# Patient Record
Sex: FEMALE | Race: WHITE | Employment: STUDENT | URBAN - METROPOLITAN AREA
[De-identification: names, ages, dates, MRNs, and addresses within clinical notes are randomized per-mention and may not be internally consistent; named-entity substitution may affect disease eponyms.]

---
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
## 2019-02-08 ENCOUNTER — OFFICE VISIT (OUTPATIENT)
Dept: GERIATRIC MEDICINE | Age: 19
End: 2019-02-08

## 2019-02-08 VITALS
HEIGHT: 64 IN | OXYGEN SATURATION: 100 % | WEIGHT: 151 LBS | SYSTOLIC BLOOD PRESSURE: 124 MMHG | BODY MASS INDEX: 25.78 KG/M2 | DIASTOLIC BLOOD PRESSURE: 74 MMHG | RESPIRATION RATE: 16 BRPM | HEART RATE: 78 BPM

## 2019-02-08 DIAGNOSIS — F33.2 SEVERE EPISODE OF RECURRENT MAJOR DEPRESSIVE DISORDER, WITHOUT PSYCHOTIC FEATURES (HCC): Primary | ICD-10-CM

## 2019-02-08 DIAGNOSIS — Z98.890 STATUS POST LUMBAR DISCECTOMY: ICD-10-CM

## 2019-02-08 DIAGNOSIS — M54.41 CHRONIC MIDLINE LOW BACK PAIN WITH RIGHT-SIDED SCIATICA: ICD-10-CM

## 2019-02-08 DIAGNOSIS — R45.89 FEELING HOPELESS: ICD-10-CM

## 2019-02-08 DIAGNOSIS — G89.29 CHRONIC MIDLINE LOW BACK PAIN WITH RIGHT-SIDED SCIATICA: ICD-10-CM

## 2019-02-08 DIAGNOSIS — L05.91 PILONIDAL CYST: ICD-10-CM

## 2019-02-08 DIAGNOSIS — M53.3 SACRAL PAIN: ICD-10-CM

## 2019-02-08 RX ORDER — DULOXETIN HYDROCHLORIDE 30 MG/1
30 CAPSULE, DELAYED RELEASE ORAL
Qty: 45 CAP | Refills: 0 | Status: SHIPPED | OUTPATIENT
Start: 2019-02-08 | End: 2019-02-22

## 2019-02-08 RX ORDER — TRAMADOL HYDROCHLORIDE AND ACETAMINOPHEN 37.5; 325 MG/1; MG/1
1 TABLET ORAL
Qty: 120 TAB | Refills: 0 | Status: SHIPPED | OUTPATIENT
Start: 2019-02-08

## 2019-02-08 RX ORDER — PREDNISONE 20 MG/1
TABLET ORAL
Qty: 30 TAB | Refills: 0 | Status: SHIPPED | OUTPATIENT
Start: 2019-02-08 | End: 2019-02-22 | Stop reason: ALTCHOICE

## 2019-02-08 NOTE — PROGRESS NOTES
ADVISED PATIENT OF THE FOLLOWING HEALTH MAINTAINCE DUE  Health Maintenance Due   Topic Date Due    Hepatitis A Peds Age 1-18 (2 of 2 - 2-dose series) 08/07/2002      Chief Complaint   Patient presents with    Medication Evaluation       1. Have you been to the ER, urgent care clinic since your last visit? Hospitalized since your last visit? No    2. Have you seen or consulted any other health care providers outside of the [address] [place] [address] since your last visit? Include any DEXA scan, mammography  or colon screening. No    3. Do you have an Advance Directive on file? no    4. Do you have a DNR on file? Full    Patient is accompanied by self I have received verbal consent from Antonella Leon to discuss any/all medical information while they are present in the room.       Advance Care Planning 2/8/2019   Patient's Healthcare Decision Maker is: Verbal statement (Legal Next of Kin remains as decision maker)   Confirm Advance Directive None   Patient Would Like to Complete Advance Directive [facility] [id number] - [place] AT [address] [address]  Phone: [phone] Fax: [phone]    Antonella Leon presents for lab draw ordered by Sena Hendrickson RN MSN ACNPC-AG-NP    The following labs were drawn and sent to lab by Fiordaliza Mckeon LPN:    CBC, Lipid Profile, CMP and HgA1C    The following tubes were sent:    Red ( 2), Lavender  ( 2) and Milo    Patient tolerated procedure well, blood obtained via venipuncture to left antecubital

## 2019-02-08 NOTE — LETTER
[facility] 
[phone] Tierra Joiner [address] [facility] [address] AFTER VISIT INSTRUCTIONS/PLAN OF CARE 02/08/19Discontinued Medication/Treatment:     
THE FOLLOWING MEDICATION/TREATMENTS HAVE BEEN STOPPED AFTER YOUR VISIT TODAY! Medications Discontinued During This Encounter Medication Reason  doxycycline (ADOXA) 50 mg tablet Therapy Completed  triamcinolone acetonide (KENALOG) 0.025 % ointment Therapy Completed  traMADol (ULTRAM) 50 mg tablet Therapy Completed Treatment Plan: Your prescriptions have been electronically sent to the pharmacy you selected:  
[facility], [address] [address]    Phone: [phone] Fax: [phone] If you do not receive your medications it is your responsibility to contact the pharmacy directly. Instructions for Prednisone: Today starting:  
Take 40 mg (2 tabs) by mouth daily x 3 days, then Take 30 mg (1 1/2 tabs) by mouth daily x 3 days, then Take 20 mg (1 tab) by mouth daily x 3 days, then Take 10 mg (1/2 tab) by mouth daily until finished. Orders Placed This Encounter  traMADol-acetaminophen (ULTRACET) 37.5-325 mg per tablet Sig: Take 1 Tab by mouth every six (6) hours as needed for Pain. Max Daily Amount: 6 Tabs. Dispense:  120 Tab This is in place of Tramadol. Take this as needed or scheduled every 6 hours for pain. I recommend you take one prior to your therapy appointments to help with the post pain.  predniSONE (DELTASONE) 20 mg tablet  (SEE INSTRUCTIONS ABOVE) Sig: Take as directed. Dispense:  30 Tab Take this for inflammation inside of joints, muscular pain, tail bone pain, back pain. Please try to get a journal of the pain & how you feel taking the medication.  DULoxetine (CYMBALTA) 30 mg capsule (If this is too expensive, go to the web site for Cymbalta, sign up for the prescription savings card)    This is for your mood, neuropathy pain, muscular pain. Do not skip this and take it before bed nightly. Sig: Take 1 Cap by mouth nightly. Dispense:  45 Cap Refill:  0 Please call me before you do not take something or you change from the plan. So I can be on the same page with you! Return 3 weeks for us to update.

## 2019-02-11 LAB
1,25(OH)2D3 SERPL-MCNC: 62.1 PG/ML (ref 19.9–79.3)
ALBUMIN SERPL-MCNC: 4.4 G/DL (ref 3.5–5.5)
ALBUMIN/GLOB SERPL: 2 {RATIO} (ref 1.2–2.2)
ALP SERPL-CCNC: 61 IU/L (ref 39–117)
ALT SERPL-CCNC: 6 IU/L (ref 0–32)
AMPHETAMINES UR QL SCN: NEGATIVE NG/ML
APPEARANCE UR: CLEAR
AST SERPL-CCNC: 16 IU/L (ref 0–40)
BARBITURATES UR QL SCN: NEGATIVE NG/ML
BASOPHILS # BLD AUTO: 0 X10E3/UL (ref 0–0.2)
BASOPHILS NFR BLD AUTO: 0 %
BENZODIAZ UR QL: NEGATIVE NG/ML
BILIRUB SERPL-MCNC: 0.7 MG/DL (ref 0–1.2)
BILIRUB UR QL STRIP: NEGATIVE
BUN SERPL-MCNC: 6 MG/DL (ref 6–20)
BUN/CREAT SERPL: 8 (ref 9–23)
BZE UR QL: NEGATIVE NG/ML
CALCIUM SERPL-MCNC: 9.1 MG/DL (ref 8.7–10.2)
CANNABINOIDS UR QL SCN: NEGATIVE NG/ML
CHLORIDE SERPL-SCNC: 105 MMOL/L (ref 96–106)
CHOLEST SERPL-MCNC: 131 MG/DL (ref 100–199)
CO2 SERPL-SCNC: 20 MMOL/L (ref 20–29)
COLOR UR: YELLOW
CREAT SERPL-MCNC: 0.8 MG/DL (ref 0.57–1)
EOSINOPHIL # BLD AUTO: 0.1 X10E3/UL (ref 0–0.4)
EOSINOPHIL NFR BLD AUTO: 3 %
ERYTHROCYTE [DISTWIDTH] IN BLOOD BY AUTOMATED COUNT: 12.9 % (ref 12.3–15.4)
EST. AVERAGE GLUCOSE BLD GHB EST-MCNC: 97 MG/DL
FOLATE SERPL-MCNC: >20 NG/ML
GLOBULIN SER CALC-MCNC: 2.2 G/DL (ref 1.5–4.5)
GLUCOSE SERPL-MCNC: 83 MG/DL (ref 65–99)
GLUCOSE UR QL: NEGATIVE
HBA1C MFR BLD: 5 % (ref 4.8–5.6)
HCG UR QL: NEGATIVE
HCT VFR BLD AUTO: 37.4 % (ref 34–46.6)
HDLC SERPL-MCNC: 57 MG/DL
HGB BLD-MCNC: 12.7 G/DL (ref 11.1–15.9)
HGB UR QL STRIP: NEGATIVE
IMM GRANULOCYTES # BLD AUTO: 0 X10E3/UL (ref 0–0.1)
IMM GRANULOCYTES NFR BLD AUTO: 0 %
KETONES UR QL STRIP: NEGATIVE
LDLC SERPL CALC-MCNC: 53 MG/DL (ref 0–99)
LEUKOCYTE ESTERASE UR QL STRIP: NEGATIVE
LYMPHOCYTES # BLD AUTO: 1.5 X10E3/UL (ref 0.7–3.1)
LYMPHOCYTES NFR BLD AUTO: 33 %
MCH RBC QN AUTO: 31.4 PG (ref 26.6–33)
MCHC RBC AUTO-ENTMCNC: 34 G/DL (ref 31.5–35.7)
MCV RBC AUTO: 92 FL (ref 79–97)
MDMA UR QL SCN: NEGATIVE NG/ML
METHADONE UR QL SCN: NEGATIVE NG/ML
METHAQUALONE UR QL: NEGATIVE NG/ML
MICRO URNS: NORMAL
MONOCYTES # BLD AUTO: 0.3 X10E3/UL (ref 0.1–0.9)
MONOCYTES NFR BLD AUTO: 7 %
NEUTROPHILS # BLD AUTO: 2.6 X10E3/UL (ref 1.4–7)
NEUTROPHILS NFR BLD AUTO: 57 %
NITRITE UR QL STRIP: NEGATIVE
OPIATES UR QL: NEGATIVE NG/ML
PCP UR QL: NEGATIVE NG/ML
PH UR STRIP: 6 [PH] (ref 5–7.5)
PLATELET # BLD AUTO: 267 X10E3/UL (ref 150–379)
POTASSIUM SERPL-SCNC: 3.6 MMOL/L (ref 3.5–5.2)
PROPOXYPH UR QL SCN: NEGATIVE NG/ML
PROT SERPL-MCNC: 6.6 G/DL (ref 6–8.5)
PROT UR QL STRIP: NEGATIVE
RBC # BLD AUTO: 4.05 X10E6/UL (ref 3.77–5.28)
SODIUM SERPL-SCNC: 142 MMOL/L (ref 134–144)
SP GR UR: 1.01 (ref 1–1.03)
SPECIMEN STATUS REPORT, ROLRST: NORMAL
TRIGL SERPL-MCNC: 105 MG/DL (ref 0–149)
TSH SERPL DL<=0.005 MIU/L-ACNC: 4.18 UIU/ML (ref 0.45–4.5)
UROBILINOGEN UR STRIP-MCNC: 0.2 MG/DL (ref 0.2–1)
VIT B12 SERPL-MCNC: 527 PG/ML (ref 232–1245)
VLDLC SERPL CALC-MCNC: 21 MG/DL (ref 5–40)
WBC # BLD AUTO: 4.5 X10E3/UL (ref 3.4–10.8)

## 2019-02-15 NOTE — PROGRESS NOTES
Advise patient in writing -     Labs look amazing. No signs of infections, blood loss anemias, kidney, liver, or other impairments. Remind patient I want to see her in the office soon for follow up on treatments started.

## 2019-02-22 ENCOUNTER — OFFICE VISIT (OUTPATIENT)
Dept: GERIATRIC MEDICINE | Age: 19
End: 2019-02-22

## 2019-02-22 VITALS
OXYGEN SATURATION: 98 % | DIASTOLIC BLOOD PRESSURE: 78 MMHG | SYSTOLIC BLOOD PRESSURE: 111 MMHG | BODY MASS INDEX: 25.35 KG/M2 | RESPIRATION RATE: 16 BRPM | HEART RATE: 79 BPM | WEIGHT: 148.5 LBS | HEIGHT: 64 IN | TEMPERATURE: 99.1 F

## 2019-02-22 DIAGNOSIS — J11.1 FLU: ICD-10-CM

## 2019-02-22 DIAGNOSIS — Z78.9 FULL CODE STATUS: ICD-10-CM

## 2019-02-22 DIAGNOSIS — J02.0 STREP SORE THROAT: ICD-10-CM

## 2019-02-22 DIAGNOSIS — R50.9 FEVER IN ADULT: ICD-10-CM

## 2019-02-22 DIAGNOSIS — F33.2 SEVERE EPISODE OF RECURRENT MAJOR DEPRESSIVE DISORDER, WITHOUT PSYCHOTIC FEATURES (HCC): ICD-10-CM

## 2019-02-22 DIAGNOSIS — Z71.2 ENCOUNTER TO DISCUSS TEST RESULTS: ICD-10-CM

## 2019-02-22 DIAGNOSIS — F41.9 ANXIETY: ICD-10-CM

## 2019-02-22 DIAGNOSIS — R09.89 CHEST CONGESTION: Primary | ICD-10-CM

## 2019-02-22 LAB
FLUAV+FLUBV AG NOSE QL IA.RAPID: NEGATIVE POS/NEG
FLUAV+FLUBV AG NOSE QL IA.RAPID: POSITIVE POS/NEG
VALID INTERNAL CONTROL?: YES

## 2019-02-22 RX ORDER — ESCITALOPRAM OXALATE 5 MG/1
TABLET ORAL
Qty: 30 TAB | Refills: 0 | Status: SHIPPED | OUTPATIENT
Start: 2019-02-22 | End: 2019-03-22

## 2019-02-22 RX ORDER — OSELTAMIVIR PHOSPHATE 75 MG/1
75 CAPSULE ORAL 2 TIMES DAILY
Qty: 10 CAP | Refills: 0 | Status: SHIPPED | OUTPATIENT
Start: 2019-02-22 | End: 2019-02-27

## 2019-02-22 RX ORDER — AMOXICILLIN 400 MG/5ML
50 POWDER, FOR SUSPENSION ORAL 2 TIMES DAILY
Qty: 300 ML | Refills: 0 | Status: SHIPPED | OUTPATIENT
Start: 2019-02-22 | End: 2019-03-02

## 2019-02-22 NOTE — PROGRESS NOTES
Reason for Visit/HPI:   
Juliana Stone is a 20 y.o. female patient who presents today for Chief Complaint Patient presents with  Medication Evaluation Diagnosis/Treatment Plan:   
Diagnoses and all orders for this visit: 1. Severe episode of recurrent major depressive disorder, without psychotic features ([facility] [address] Comments: PHQ9 today was 8. She is obviously struggling with depression, anxiety, & hopelessness due to her medical conditions. Discussed with patient the pros and cons to using a medication to help control emotions, feelings, and health. She agrees and requests to try Cymbalta after researching this medication. We will start slow and low. Move up as able. She was instructed to call me when she needs someone to talk to or to discuss medications changes. Orders: 
-     CBC WITH AUTOMATED DIFF 
-     COLLECTION VENOUS BLOOD,VENIPUNCTURE 
-     METABOLIC PANEL, COMPREHENSIVE 
-     HEMOGLOBIN A1C WITH EAG 
-     LIPID PANEL 
-     VITAMIN D, 1, 25 DIHYDROXY 
-     VITAMIN B12 & FOLATE 
-     TSH REFLEX TO T4 
-     traMADol-acetaminophen (ULTRACET) 37.5-325 mg per tablet; Take 1 Tab by mouth every six (6) hours as needed for Pain. Max Daily Amount: 6 Tabs. -     10-PANEL URINE DRUG SCREEN 
-     URINALYSIS W/ RFLX MICROSCOPIC 
-     HCG URINE, QL 
 
2. Feeling hopeless Comments: [facility] is crying in the appointment. She is overwhelmed with her medical conditions as well as confused on how her future looks. Orders: 
-     CBC WITH AUTOMATED DIFF 
-     COLLECTION VENOUS BLOOD,VENIPUNCTURE 
-     METABOLIC PANEL, COMPREHENSIVE 
-     HEMOGLOBIN A1C WITH EAG 
-     LIPID PANEL 
-     VITAMIN D, 1, 25 DIHYDROXY 
-     VITAMIN B12 & FOLATE 
-     TSH REFLEX TO T4 
-     traMADol-acetaminophen (ULTRACET) 37.5-325 mg per tablet; Take 1 Tab by mouth every six (6) hours as needed for Pain. Max Daily Amount: 6 Tabs.  
-     10-PANEL URINE DRUG SCREEN 
-     URINALYSIS W/ RFLX MICROSCOPIC 
 -     HCG URINE, QL 
 
3. Chronic midline low back pain with right-sided sciatica Comments: 
Pain continues 3 months after surgery to be present daily. She is seeing her specialist next week for assessment of her conditions. Orders: 
-     CBC WITH AUTOMATED DIFF 
-     COLLECTION VENOUS BLOOD,VENIPUNCTURE 
-     METABOLIC PANEL, COMPREHENSIVE 
-     HEMOGLOBIN A1C WITH EAG 
-     LIPID PANEL 
-     VITAMIN D, 1, 25 DIHYDROXY 
-     VITAMIN B12 & FOLATE 
-     TSH REFLEX TO T4 
-     traMADol-acetaminophen (ULTRACET) 37.5-325 mg per tablet; Take 1 Tab by mouth every six (6) hours as needed for Pain. Max Daily Amount: 6 Tabs. -     10-PANEL URINE DRUG SCREEN 
-     URINALYSIS W/ RFLX MICROSCOPIC 
-     HCG URINE, QL 
 
4. Status post lumbar discectomy Comments: 
3 months post operative eval in [place] next Monday. She is looking forward to this as she believe she is having too much pain to be 3 months post op. Orders: 
-     CBC WITH AUTOMATED DIFF 
-     COLLECTION VENOUS BLOOD,VENIPUNCTURE 
-     METABOLIC PANEL, COMPREHENSIVE 
-     HEMOGLOBIN A1C WITH EAG 
-     LIPID PANEL 
-     VITAMIN D, 1, 25 DIHYDROXY 
-     VITAMIN B12 & FOLATE 
-     TSH REFLEX TO T4 
-     traMADol-acetaminophen (ULTRACET) 37.5-325 mg per tablet; Take 1 Tab by mouth every six (6) hours as needed for Pain. Max Daily Amount: 6 Tabs. -     10-PANEL URINE DRUG SCREEN 
-     URINALYSIS W/ RFLX MICROSCOPIC 
-     HCG URINE, QL 
 
5. Sacral pain Comments: 
Pt states she is having increased lower back & sacral pain all the time. Her PT seems to be irritating this more. She wants to stop her PT. Orders: 
-     CBC WITH AUTOMATED DIFF 
-     COLLECTION VENOUS BLOOD,VENIPUNCTURE 
-     METABOLIC PANEL, COMPREHENSIVE 
-     HEMOGLOBIN A1C WITH EAG 
-     LIPID PANEL 
-     VITAMIN D, 1, 25 DIHYDROXY 
-     VITAMIN B12 & FOLATE 
-     TSH REFLEX TO T4 
-     traMADol-acetaminophen (ULTRACET) 37.5-325 mg per tablet;  Take 1 Tab by mouth every six (6) hours as needed for Pain. Max Daily Amount: 6 Tabs. -     10-PANEL URINE DRUG SCREEN 
-     URINALYSIS W/ RFLX MICROSCOPIC 
-     HCG URINE, QL 
 
6. Pilonidal cyst 
Comments: 
Patient is concerned she has a pilonidal cyst that could be aggrevated by her PT> Orders: 
-     traMADol-acetaminophen (ULTRACET) 37.5-325 mg per tablet; Take 1 Tab by mouth every six (6) hours as needed for Pain. Max Daily Amount: 6 Tabs. Other orders -     SPECIMEN STATUS REPORT Discontinued Care: The following treatment modalities have been discontinued by the provider today:  
Medications Discontinued During This Encounter Medication Reason  doxycycline (ADOXA) 50 mg tablet Therapy Completed  triamcinolone acetonide (KENALOG) 0.025 % ointment Therapy Completed  traMADol (ULTRAM) 50 mg tablet Therapy Completed Follow Up: Follow-up Disposition: 
Return in about 1 month (around 3/8/2019) for with the NP for follow up to your treatment plan. Subjective: No Known Allergies Prior to Admission medications Medication Sig Start Date End Date Taking? Authorizing Provider  
traMADol-acetaminophen (ULTRACET) 37.5-325 mg per tablet Take 1 Tab by mouth every six (6) hours as needed for Pain. Max Daily Amount: 6 Tabs. 2/8/19  Yes Sheryl Auguste NP  
ibuprofen (MOTRIN) 400 mg tablet Take  by mouth every six (6) hours as needed for Pain. Yes Provider, Susu  
dimethicone ([facility]) 2 % topical cream Apply to scars once daily until healed. 12/3/18  Yes Sheryl Auguste NP  
oseltamivir (TAMIFLU) 75 mg capsule Take 1 Cap by mouth two (2) times a day for 5 days. 2/22/19 2/27/19  Sheryl Auguste NP  
amoxicillin (AMOXIL) 400 mg/5 mL suspension Take 21.1 mL by mouth two (2) times a day for 8 days. 2/22/19 3/2/19  Sheryl Auguste NP  
magic mouthwash solution Take 5 mL by mouth three (3) times daily as needed for Pain for up to 5 days. Magic mouth wash Maalox Lidocaine 2% viscous Diphenhydramine oral solution Pharmacy to mix equal portions of ingredients to a total volume as indicated in the dispense amount. 2/22/19 2/27/19  Chloe Colón NP Codeine-guaiFENesin 6.3-100 mg/5 mL liqd Take 2.5-5 mL by mouth every twelve (12) hours as needed for up to 5 days. Max Daily Amount: 10 mL. 2/22/19 2/27/19  Chloe Colón NP  
escitalopram oxalate (LEXAPRO) 5 mg tablet Take 1 Tab by mouth daily for 14 days, THEN 2 Tabs daily for 14 days. 2/22/19 3/22/19  Chloe Colón NP Past Medical History:  
Diagnosis Date  Anxiety 7/27/2017  Asthma  Bertolotti's syndrome  Depression  GERD (gastroesophageal reflux disease)  Migraines   
 migraines Past Surgical History:  
Procedure Laterality Date  DERMATOLOGICAL PROCEDURE  2002  
 purple lesion removed from lower back  HX BACK SURGERY  11/06/2018  HX LUMBAR DISKECTOMY L4 L5 Social History Tobacco Use  Smoking status: Never Smoker  Smokeless tobacco: Never Used Substance Use Topics  Alcohol use: No  
 Drug use: No  
  
Family History Problem Relation Age of Onset  Arthritis-rheumatoid Father  Other Father   
     muscular neuropathy  Hypertension Maternal Grandfather  Cancer Paternal Grandfather   
     bone marrow Advance Care Planning 2/8/2019 Patient's Healthcare Decision Maker is: Verbal statement (Legal Next of Kin remains as decision maker) Confirm Advance Directive None Patient Would Like to Complete Advance Directive No  
 
Test Results:  
 
Office Visit on 02/08/2019 Component Date Value Ref Range Status  WBC 02/08/2019 4.5  3.4 - 10.8 x10E3/uL Final  
 RBC 02/08/2019 4.05  3.77 - 5.28 x10E6/uL Final  
 HGB 02/08/2019 12.7  11.1 - 15.9 g/dL Final  
 HCT 02/08/2019 37.4  34.0 - 46.6 % Final  
 MCV 02/08/2019 92  79 - 97 fL Final  
 MCH 02/08/2019 31.4  26.6 - 33.0 pg Final  
  MCHC 02/08/2019 34.0  31.5 - 35.7 g/dL Final  
 RDW 02/08/2019 12.9  12.3 - 15.4 % Final  
 PLATELET 63/44/6893 756  150 - 379 x10E3/uL Final  
 NEUTROPHILS 02/08/2019 57  Not Estab. % Final  
 Lymphocytes 02/08/2019 33  Not Estab. % Final  
 MONOCYTES 02/08/2019 7  Not Estab. % Final  
 EOSINOPHILS 02/08/2019 3  Not Estab. % Final  
 BASOPHILS 02/08/2019 0  Not Estab. % Final  
 ABS. NEUTROPHILS 02/08/2019 2.6  1.4 - 7.0 x10E3/uL Final  
 Abs Lymphocytes 02/08/2019 1.5  0.7 - 3.1 x10E3/uL Final  
 ABS. MONOCYTES 02/08/2019 0.3  0.1 - 0.9 x10E3/uL Final  
 ABS. EOSINOPHILS 02/08/2019 0.1  0.0 - 0.4 x10E3/uL Final  
 ABS. BASOPHILS 02/08/2019 0.0  0.0 - 0.2 x10E3/uL Final  
 IMMATURE GRANULOCYTES 02/08/2019 0  Not Estab. % Final  
 ABS. IMM. GRANS. 02/08/2019 0.0  0.0 - 0.1 x10E3/uL Final  
 Glucose 02/08/2019 83  65 - 99 mg/dL Final  
 BUN 02/08/2019 6  6 - 20 mg/dL Final  
 Creatinine 02/08/2019 0.80  0.57 - 1.00 mg/dL Final  
 GFR est non-AA 02/08/2019 106  >59 mL/min/1.73 Final  
 GFR est AA 02/08/2019 123  >59 mL/min/1.73 Final  
 BUN/Creatinine ratio 02/08/2019 8* 9 - 23 Final  
 Sodium 02/08/2019 142  134 - 144 mmol/L Final  
 Potassium 02/08/2019 3.6  3.5 - 5.2 mmol/L Final  
 Chloride 02/08/2019 105  96 - 106 mmol/L Final  
 CO2 02/08/2019 20  20 - 29 mmol/L Final  
 Calcium 02/08/2019 9.1  8.7 - 10.2 mg/dL Final  
 Protein, total 02/08/2019 6.6  6.0 - 8.5 g/dL Final  
 Albumin 02/08/2019 4.4  3.5 - 5.5 g/dL Final  
 GLOBULIN, TOTAL 02/08/2019 2.2  1.5 - 4.5 g/dL Final  
 A-G Ratio 02/08/2019 2.0  1.2 - 2.2 Final  
 Bilirubin, total 02/08/2019 0.7  0.0 - 1.2 mg/dL Final  
 Alk. phosphatase 02/08/2019 61  39 - 117 IU/L Final  
 AST (SGOT) 02/08/2019 16  0 - 40 IU/L Final  
 ALT (SGPT) 02/08/2019 6  0 - 32 IU/L Final  
 Hemoglobin A1c 02/08/2019 5.0  4.8 - 5.6 % Final  
 Comment:          Prediabetes: 5.7 - 6.4 Diabetes: >6.4 Glycemic control for adults with diabetes: <7.0  Estimated average glucose 02/08/2019 97  mg/dL Final  
 Cholesterol, total 02/08/2019 131  100 - 199 mg/dL Final  
 Triglyceride 02/08/2019 105  0 - 149 mg/dL Final  
 HDL Cholesterol 02/08/2019 57  >39 mg/dL Final  
 VLDL, calculated 02/08/2019 21  5 - 40 mg/dL Final  
 LDL, calculated 02/08/2019 53  0 - 99 mg/dL Final  
 Calcitriol (Vit D 1, 25 di-OH) 02/08/2019 62.1  19.9 - 79.3 pg/mL Final  
 Vitamin B12 02/08/2019 527  232 - 1,245 pg/mL Final  
 Folate 02/08/2019 >20.0  >3.0 ng/mL Final  
 Comment: A serum folate concentration of less than 3.1 ng/mL is 
considered to represent clinical deficiency.  TSH 02/08/2019 4.180  0.450 - 4.500 uIU/mL Final  
 Amphetamines, urine 02/08/2019 Negative  Wgmish=4690 ng/mL Final  
 Amphetamine test includes Amphetamine and Methamphetamine.  MDMA 02/08/2019 Negative  Cyjfin=536 ng/mL Final  
 Barbiturates 02/08/2019 Negative  Lyobro=999 ng/mL Final  
 Benzodiazepines 02/08/2019 Negative  Tupuax=971 ng/mL Final  
 Cannabinoids 02/08/2019 Negative  Cutoff=50 ng/mL Final  
 Cocaine 02/08/2019 Negative  Lddkzs=872 ng/mL Final  
 Methaqualone 02/08/2019 Negative  Pojoko=081 ng/mL Final  
 Opiates 02/08/2019 Negative  Asmjel=009 ng/mL Final  
 Opiate test includes Codeine and Morphine only.   
 Phencyclidine 02/08/2019 Negative  Cutoff=25 ng/mL Final  
 Methadone Screen, Urine 02/08/2019 Negative  Fnghnh=960 ng/mL Final  
 PROPOXYPHENE, URINE 02/08/2019 Negative  Tolcrt=545 ng/mL Final  
 Specific Gravity 02/08/2019 1.007  1.005 - 1.030 Final  
 pH (UA) 02/08/2019 6.0  5.0 - 7.5 Final  
 Color 02/08/2019 Yellow  Yellow Final  
 Appearance 02/08/2019 Clear  Clear Final  
 Leukocyte Esterase 02/08/2019 Negative  Negative Final  
 Protein 02/08/2019 Negative  Negative/Trace Final  
 Glucose 02/08/2019 Negative  Negative Final  
 Ketone 02/08/2019 Negative  Negative Final  
  Blood 02/08/2019 Negative  Negative Final  
 Bilirubin 02/08/2019 Negative  Negative Final  
 Urobilinogen 02/08/2019 0.2  0.2 - 1.0 mg/dL Final  
 Nitrites 02/08/2019 Negative  Negative Final  
 Microscopic Examination 02/08/2019 Comment   Final  
 Microscopic not indicated and not performed.  Pregnancy test, urine 02/08/2019 Negative  Negative Final  
Admission on 11/16/2018, Discharged on 11/16/2018 Component Date Value Ref Range Status  Calcium, ionized (POC) 11/16/2018 1.24  1.12 - 1.32 mmol/L Final  
 Sodium (POC) 11/16/2018 139  136 - 145 mmol/L Final  
 Potassium (POC) 11/16/2018 3.8  3.5 - 5.1 mmol/L Final  
 Chloride (POC) 11/16/2018 101  98 - 107 mmol/L Final  
 CO2 (POC) 11/16/2018 26  21 - 32 mmol/L Final  
 Anion gap (POC) 11/16/2018 17  10 - 20 mmol/L Final  
 PLEASE NOTE NEW REFERENCE RANGE  
 Glucose (POC) 11/16/2018 91  65 - 100 mg/dL Final  
 BUN (POC) 11/16/2018 4* 9 - 20 mg/dL Final  
 Creatinine (POC) 11/16/2018 0.6  0.6 - 1.3 mg/dL Final  
 GFRAA, POC 11/16/2018 >60  >60 ml/min/1.73m2 Final  
 GFRNA, POC 11/16/2018 >60  >60 ml/min/1.73m2 Final  
 Hematocrit (POC) 11/16/2018 35  35.0 - 47.0 % Final  
 Comment 11/16/2018 Comment Not Indicated. Final  
 
 
Objective:  
 
Vitals:  
 02/08/19 1312 BP: 124/74 Pulse: 78 Resp: 16 SpO2: 100% Weight: 151 lb (68.5 kg) Height: 5' 4\" (1.626 m) Wt Readings from Last 3 Encounters:  
02/22/19 148 lb 8 oz (67.4 kg) 02/08/19 151 lb (68.5 kg) 12/03/18 148 lb (67.1 kg) (78 %, Z= 0.78)* * Growth percentiles are based on CDC (Girls, 2-20 Years) data. BP Readings from Last 3 Encounters:  
02/22/19 111/78  
02/08/19 124/74  
12/03/18 111/73 (41 %, Z = -0.24 /  80 %, Z = 0.86)* *BP percentiles are based on the August 2017 AAP Clinical Practice Guideline for girls Review of Systems Constitutional: Positive for fatigue. Negative for activity change, appetite change and fever. HENT: Negative for congestion, dental problem, ear pain, hearing loss, postnasal drip, sinus pressure, sneezing, sore throat and trouble swallowing. Eyes: Negative for discharge, redness and visual disturbance. Respiratory: Negative for apnea, cough, chest tightness, shortness of breath and wheezing. Cardiovascular: Negative for chest pain, palpitations and leg swelling. Gastrointestinal: Negative for abdominal distention, abdominal pain, blood in stool, constipation, diarrhea, nausea and vomiting. Endocrine: Negative for polydipsia, polyphagia and polyuria. Genitourinary: Negative for flank pain, frequency and urgency. Musculoskeletal: Positive for arthralgias, back pain and gait problem. Negative for joint swelling and neck pain. Skin: Negative for color change, pallor, rash and wound. Allergic/Immunologic: Negative for environmental allergies and food allergies. Neurological: Positive for dizziness and weakness. Negative for tremors, light-headedness, numbness and headaches. Hematological: Negative for adenopathy. Psychiatric/Behavioral: Positive for decreased concentration, dysphoric mood and sleep disturbance. Negative for agitation and confusion. The patient is nervous/anxious. Physical Exam  
Constitutional: She is oriented to person, place, and time. Vital signs are normal. She appears to not be writhing in pain, not malnourished and not dehydrated. She appears unhealthy. She does not have a sickly appearance. She appears distressed. Young, frail,  female. Mood is sad with crying spells. She has multiple medical conditions that appear to be wearing on her. HENT:  
Head: Normocephalic and atraumatic. Right Ear: Hearing, tympanic membrane, external ear and ear canal normal.  
Left Ear: Hearing, tympanic membrane and ear canal normal. There is tenderness.   
Nose: Nose normal.  
Mouth/Throat: Uvula is midline, oropharynx is clear and moist and mucous membranes are normal. Mucous membranes are not pale, not dry and not cyanotic. No oral lesions. No uvula swelling. No posterior oropharyngeal edema or posterior oropharyngeal erythema. Bilateral TM's are intact. No signs of cerumen or cause for Left ear pain. No signs of trauma or injury. Pain is not reproducible. Eyes: Conjunctivae, EOM and lids are normal. Pupils are equal, round, and reactive to light. Lids are everted and swept, no foreign bodies found. Neck: Trachea normal. Neck supple. Normal carotid pulses, no hepatojugular reflux and no JVD present. Spinous process tenderness and muscular tenderness present. Erythema and decreased range of motion present. No thyromegaly present. Chronic ongoing. Dx with Flat Neck Syndrome. Cardiovascular: Normal rate, regular rhythm, S1 normal, S2 normal, normal heart sounds, intact distal pulses and normal pulses. Exam reveals no gallop and no friction rub. No murmur heard. No extremity edema is present during today's exam.   
Pulmonary/Chest: Effort normal and breath sounds normal.  
Abdominal: Soft. Normal appearance and bowel sounds are normal. She exhibits no distension. There is no hepatosplenomegaly. There is no tenderness. There is no CVA tenderness. Musculoskeletal:  
     Cervical back: She exhibits decreased range of motion, tenderness, bony tenderness, pain and spasm. Lumbar back: She exhibits decreased range of motion, tenderness, swelling, edema, pain and spasm. Back: 
 
Generalized chronic extremity weakness is present. Increased pain in the sacral region. Seems to be inflamed by Physical Therapy. Neurological: She is alert and oriented to person, place, and time. She has normal reflexes and intact cranial nerves. She displays weakness and atrophy. A sensory deficit is present. She exhibits abnormal muscle tone. Coordination and gait abnormal. GCS score is 15. Skin: Skin is warm, dry and intact. No bruising and no rash noted. She is not diaphoretic. No cyanosis or erythema. No pallor. Nails show no clubbing. See Musculator Skeletal Exam.   
Psychiatric: Her mood appears anxious. Her affect is labile. She expresses impulsivity. She exhibits a depressed mood. She is apathetic. She exhibits disordered thought content and abnormal new learning ability. She has a flat affect. She has a safety plan in place. She is also starting treatment for severe depression today. Disclaimer: Ms. Nicanor Tipton has been advised to call or return to our office if symptoms worsen/change/persist. We as a care team including the patient; discussed expected course/resolution/complications of diagnosis in detail today. Medication risks/benefits/costs/interactions/alternatives discussed Nicanor Tipton was given an after visit summary which includes diagnoses, current medications, & vitals. Nicanor Tipton expressed understanding with the diagnosis and plan.

## 2019-02-22 NOTE — PROGRESS NOTES
ADVISED PATIENT OF THE FOLLOWING HEALTH MAINTAINCE DUE  Health Maintenance Due   Topic Date Due    Hepatitis A Peds Age 1-18 (2 of 2 - 2-dose series) 08/07/2001      Chief Complaint   Patient presents with    Cough     Patient being seen for cough, sore throat, headache, fever, All started Tuesday. 1. Have you been to the ER, urgent care clinic since your last visit? Hospitalized since your last visit? No    2. Have you seen or consulted any other health care providers outside of the [address] [place] [address] since your last visit? Include any DEXA scan, mammography  or colon screening. No    3. Do you have an Advance Directive on file? no    4. Do you have a DNR on file? Full    Patient is accompanied by self I have received verbal consent from Grant Bernheim to discuss any/all medical information while they are present in the room.       Advance Care Planning 2/8/2019   Patient's Healthcare Decision Maker is: Verbal statement (Legal Next of Kin remains as decision maker)   Confirm Advance Directive None   Patient Would Like to Complete Advance Directive No         [facility] [employer] [id number] - Valery Scott AT [address] [place] [id number]  Phone: [phone] Fax: [phone]    Results for orders placed or performed in visit on 02/22/19   AMB POC MADDISON INFLUENZA A/B TEST   Result Value Ref Range    VALID INTERNAL CONTROL POC Yes     Influenza A Ag POC Positive Negative Pos/Neg    Influenza B Ag POC Negative Negative Pos/Neg

## 2019-02-22 NOTE — PATIENT INSTRUCTIONS
Back Pain: Care Instructions  Your Care Instructions    Back pain has many possible causes. It is often related to problems with muscles and ligaments of the back. It may also be related to problems with the nerves, discs, or bones of the back. Moving, lifting, standing, sitting, or sleeping in an awkward way can strain the back. Sometimes you don't notice the injury until later. Arthritis is another common cause of back pain. Although it may hurt a lot, back pain usually improves on its own within several weeks. Most people recover in 12 weeks or less. Using good home treatment and being careful not to stress your back can help you feel better sooner. Follow-up care is a key part of your treatment and safety. Be sure to make and go to all appointments, and call your doctor if you are having problems. It's also a good idea to know your test results and keep a list of the medicines you take. How can you care for yourself at home? · Sit or lie in positions that are most comfortable and reduce your pain. Try one of these positions when you lie down:  ? Lie on your back with your knees bent and supported by large pillows. ? Lie on the floor with your legs on the seat of a sofa or chair. ? Lie on your side with your knees and hips bent and a pillow between your legs. ? Lie on your stomach if it does not make pain worse. · Do not sit up in bed, and avoid soft couches and twisted positions. Bed rest can help relieve pain at first, but it delays healing. Avoid bed rest after the first day of back pain. · Change positions every 30 minutes. If you must sit for long periods of time, take breaks from sitting. Get up and walk around, or lie in a comfortable position. · Try using a heating pad on a low or medium setting for 15 to 20 minutes every 2 or 3 hours. Try a warm shower in place of one session with the heating pad. · You can also try an ice pack for 10 to 15 minutes every 2 to 3 hours.  Put a thin cloth between the ice pack and your skin. · Take pain medicines exactly as directed. ? If the doctor gave you a prescription medicine for pain, take it as prescribed. ? If you are not taking a prescription pain medicine, ask your doctor if you can take an over-the-counter medicine. · Take short walks several times a day. You can start with 5 to 10 minutes, 3 or 4 times a day, and work up to longer walks. Walk on level surfaces and avoid hills and stairs until your back is better. · Return to work and other activities as soon as you can. Continued rest without activity is usually not good for your back. · To prevent future back pain, do exercises to stretch and strengthen your back and stomach. Learn how to use good posture, safe lifting techniques, and proper body mechanics. When should you call for help? Call your doctor now or seek immediate medical care if:    · You have new or worsening numbness in your legs.     · You have new or worsening weakness in your legs. (This could make it hard to stand up.)     · You lose control of your bladder or bowels.    Watch closely for changes in your health, and be sure to contact your doctor if:    · You have a fever, lose weight, or don't feel well.     · You do not get better as expected. Where can you learn more? Go to http://earl-molly.info/. Enter E908 in the search box to learn more about \"Back Pain: Care Instructions. \"  Current as of: September 20, 2018  Content Version: 11.9  © 1631-6045 [employer]. Care instructions adapted under license by [employer] (which disclaims liability or warranty for this information). If you have questions about a medical condition or this instruction, always ask your healthcare professional. [place] [address] any warranty or liability for your use of this information.          Chronic Pain: Care Instructions  Your Care Instructions    Chronic pain is pain that lasts a long time (months or even years) and may or may not have a clear cause. It is different from acute pain, which usually does have a clear cause--like an injury or illness--and gets better over time. Chronic pain:  · Lasts over time but may vary from day to day. · Does not go away despite efforts to end it. · May disrupt your sleep and lead to fatigue. · May cause depression or anxiety. · May make your muscles tense, causing more pain. · Can disrupt your work, hobbies, home life, and relationships with friends and family. Chronic pain is a very real condition. It is not just in your head. Treatment can help and usually includes several methods used together, such as medicines, physical therapy, exercise, and other treatments. Learning how to relax and changing negative thought patterns can also help you cope. Chronic pain is complex. Taking an active role in your treatment will help you better manage your pain. Tell your doctor if you have trouble dealing with your pain. You may have to try several things before you find what works best for you. Follow-up care is a key part of your treatment and safety. Be sure to make and go to all appointments, and call your doctor if you are having problems. It's also a good idea to know your test results and keep a list of the medicines you take. How can you care for yourself at home? · Pace yourself. Break up large jobs into smaller tasks. Save harder tasks for days when you have less pain, or go back and forth between hard tasks and easier ones. Take rest breaks. · Relax, and reduce stress. Relaxation techniques such as deep breathing or meditation can help. · Keep moving. Gentle, daily exercise can help reduce pain over the long run. Try low- or no-impact exercises such as walking, swimming, and stationary biking. Do stretches to stay flexible. · Try heat, cold packs, and massage. · Get enough sleep. Chronic pain can make you tired and drain your energy.  Talk with your doctor if you have trouble sleeping because of pain. · Think positive. Your thoughts can affect your pain level. Do things that you enjoy to distract yourself when you have pain instead of focusing on the pain. See a movie, read a book, listen to music, or spend time with a friend. · If you think you are depressed, talk to your doctor about treatment. · Keep a daily pain diary. Record how your moods, thoughts, sleep patterns, activities, and medicine affect your pain. You may find that your pain is worse during or after certain activities or when you are feeling a certain emotion. Having a record of your pain can help you and your doctor find the best ways to treat your pain. · Take pain medicines exactly as directed. ? If the doctor gave you a prescription medicine for pain, take it as prescribed. ? If you are not taking a prescription pain medicine, ask your doctor if you can take an over-the-counter medicine. Reducing constipation caused by pain medicine  · Include fruits, vegetables, beans, and whole grains in your diet each day. These foods are high in fiber. · Drink plenty of fluids, enough so that your urine is light yellow or clear like water. If you have kidney, heart, or liver disease and have to limit fluids, talk with your doctor before you increase the amount of fluids you drink. · If your doctor recommends it, get more exercise. Walking is a good choice. Bit by bit, increase the amount you walk every day. Try for at least 30 minutes on most days of the week. · Schedule time each day for a bowel movement. A daily routine may help. Take your time and do not strain when having a bowel movement. When should you call for help? Call your doctor now or seek immediate medical care if:    · Your pain gets worse or is out of control.     · You feel down or blue, or you do not enjoy things like you once did. You may be depressed, which is common in people with chronic pain.  Depression can be treated.     · You have vomiting or cramps for more than 2 hours.    Watch closely for changes in your health, and be sure to contact your doctor if:    · You cannot sleep because of pain.     · You are very worried or anxious about your pain.     · You have trouble taking your pain medicine.     · You have any concerns about your pain medicine.     · You have trouble with bowel movements, such as:  ? No bowel movement in 3 days. ? Blood in the anal area, in your stool, or on the toilet paper. ? Diarrhea for more than 24 hours. Where can you learn more? Go to http://earl-molly.info/. Enter N004 in the search box to learn more about \"Chronic Pain: Care Instructions. \"  Current as of: Crystal 3, 2018  Content Version: 11.9  © 4398-0225 [employer]. Care instructions adapted under license by [employer] (which disclaims liability or warranty for this information). If you have questions about a medical condition or this instruction, always ask your healthcare professional. [place] [address] any warranty or liability for your use of this information. Depression Treatment in Teens: Care Instructions  Your Care Instructions    Depression is a disease that affects the way you feel, think, and act. It causes symptoms such as low energy, loss of interest in daily activities, and sadness or grouchiness that goes on for a long time. You may sleep a lot or move or speak more slowly than usual. Teens with severe depression may see or hear things that aren't there (hallucinations) or believe things that aren't true (delusions). Don't feel embarrassed or ashamed about depression. Depression is caused by changes in the natural chemicals in your brain. Depression is not a character flaw, and it does not mean that you are a bad or weak person. It does not mean that you are going crazy. You can get over depression. You don't have to feel bad.  Medicines, counseling, and self-care can all help. Follow-up care is a key part of your treatment and safety. Be sure to make and go to all appointments, and call your doctor if you are having problems. It's also a good idea to know your test results and keep a list of the medicines you take. How can you care for yourself at home? Counseling  · Learn about counseling. It may be all you need if you have mild depression. Counseling deals with how you think about things and how you act each day. · Find counseling that works for you. You and your counselor may work together, or you may have group counseling. Family counseling also may be helpful. · Find a counselor you can feel at ease with and trust.  Antidepressant medicines  · If the doctor prescribed antidepressant medicines, take them exactly as prescribed. Don't stop taking them without talking to your doctor. Antidepressants may need time to work. If you stop taking them too soon, your symptoms may come back or get worse. · Learn about antidepressant medicines. They can improve or end the symptoms of depression. ? You may start to feel better after 1 to 3 weeks of taking the medicine. But it can take as many as 6 to 8 weeks to see more improvement. You will have to take the medicine for at least 6 months, and often longer. · Work with your doctor to find the best antidepressant for you. You may have to try different antidepressants before you find one that works. If you have concerns about the medicine, or if you don't feel better in 3 weeks, talk to your doctor. · Watch for side effects. The medicines can make you feel tired, dizzy, or nervous. Many side effects are mild and go away on their own after a few weeks. Talk to your doctor if side effects bother you too much. · Don't suddenly stop taking antidepressants. Stopping suddenly could be dangerous. Your doctor can help you slowly reduce the dose to prevent problems.   To help manage depression  · Talk to your doctor, counselor, or another adult right away if you have thoughts of hurting yourself or others. Sometimes people with depression have these thoughts. · Keep the numbers for these national suicide hotlines: 8-137-874-TALK (4-777.590.3639) and 1-656-AKPFCWF (0-772.183.9002). If you or someone you know talks about suicide or feeling hopeless, get help right away. · If you are taking medicine, take it exactly as prescribed. Call your doctor if you think you are having a problem with your medicine. · If you have a counselor, go to all your appointments. · Get support from others. ? Your family can help you get the right treatment and deal with your symptoms. ? Social support and support groups give you the chance to talk with teens who are going through the same things you are. · Plan something pleasant for yourself every day. Include activities that you have enjoyed in the past.  · Spend time with family and friends. It may help to speak openly about your depression with people you trust.  · Think about putting off big decisions until your depression has lifted. For example, wait a bit on making decisions about dropping out of school or choosing a college. Talk it over with friends and family who can help you look at the whole picture. · Think positively. Challenge negative thoughts with statements such as \"I am hopeful,\" \"Things will get better,\" and \"I can ask for the help I need. \" Write down these statements and read them often, even if you don't believe them yet. · Be patient with yourself. It took time for your depression to develop, and it will take time for your symptoms to improve. Do not take on too much or be too hard on yourself. To stay healthy  · Get plenty of exercise every day. Go for a walk or jog, ride your bike, or play sports with friends. · Get enough sleep. A good night's sleep can help mood and stress levels. Avoid sleeping pills unless your doctor prescribes them.   · Eat a balanced diet. This helps your body deal with tension and stress. Whole grains, dairy products, fruits, vegetables, and protein are part of a balanced diet. If you do not feel hungry, eat small snacks rather than large meals. · Do not drink alcohol, use illegal drugs, or take medicines that your doctor has not prescribed for you. They may interfere with your treatment. When should you call for help? Call 911 anytime you think you may need emergency care. For example, call if:    · You are thinking about suicide or are threatening suicide.     · You feel you cannot stop from hurting yourself or someone else.     · You hear or see things that aren't real.     · You think or speak in a bizarre way that is not like your usual behavior.    Call your doctor now or seek immediate medical care if:    · You have thoughts of hurting yourself or others.     · You are drinking a lot of alcohol or using illegal drugs.     · You are talking or writing about death.    Watch closely for changes in your health, and be sure to contact your doctor if:    · You find it hard or it's getting harder to deal with school, a job, family, or friends.     · You think your treatment is not helping or you are not getting better.     · Your symptoms get worse or you get new symptoms.     · You have any problems with your antidepressant medicines, such as side effects, or you are thinking about stopping your medicine.     · You are having manic behavior, such as having very high energy, needing less sleep than normal, or showing risky behavior such as spending money you don't have or abusing others verbally or physically. Where can you learn more? Go to http://earl-molly.info/. Enter Rachel Burdick in the search box to learn more about \"Depression Treatment in Teens: Care Instructions. \"  Current as of: September 11, 2018  Content Version: 11.9  © 7588-8755 [employer].  Care instructions adapted under license by [facility] (which disclaims liability or warranty for this information). If you have questions about a medical condition or this instruction, always ask your healthcare professional. [address] any warranty or liability for your use of this information. Coccyx Pain: Care Instructions  Your Care Instructions    The coccyx is your tailbone. You can have pain in your tailbone from a fall or other injury. Pregnancy and childbirth also can cause tailbone pain. Sometimes, the cause of pain is not known. A tailbone injury causes pain when you sit, especially when you slump or sit on a hard seat. Straining to have a bowel movement also can be very painful. Tailbone injuries can take several months to heal, but in some cases the pain goes even longer. You can take steps at home to ease the pain. In some cases, a doctor injects a corticosteroid medicine into the coccyx to reduce swelling and pain. Follow-up care is a key part of your treatment and safety. Be sure to make and go to all appointments, and call your doctor if you are having problems. It's also a good idea to know your test results and keep a list of the medicines you take. How can you care for yourself at home? · Take pain medicines exactly as directed. ? If the doctor gave you a prescription medicine for pain, take it as prescribed. ? If you are not taking a prescription pain medicine, take an over-the-counter medicine to reduce pain. · Put ice or a cold pack on your tailbone for 10 to 20 minutes at a time. Try to do this every 1 to 2 hours for the next 3 days (when you are awake) or until the swelling goes down. Put a thin cloth between the ice and your skin. · About 2 or 3 days after your injury, you can alternate ice and heat. To soothe the tailbone area, take a warm bath for 20 minutes, 3 or 4 times a day. · Sit on soft, padded surfaces. A doughnut-shaped pillow can take pressure off the tailbone.   · Avoid constipation, because straining to have a bowel movement will increase your tailbone pain. ? Include fruits, vegetables, beans, and whole grains in your diet each day. These foods are high in fiber. ? Drink plenty of fluids, enough so that your urine is light yellow or clear like water. If you have kidney, heart, or liver disease and have to limit fluids, talk with your doctor before you increase the amount of fluids you drink. ? Get some exercise every day. Build up slowly to 30 to 60 minutes a day on 5 or more days of the week. ? Take a fiber supplement, such as Citrucel or Metamucil, every day if needed. Read and follow all instructions on the label. ? Schedule time each day for a bowel movement. A daily routine may help. Take your time and do not strain when having a bowel movement. · Follow your doctor's directions for stretching and other exercises that might help with pain. When should you call for help? Call 911 anytime you think you may need emergency care. For example, call if:    · You are unable to move a leg at all.   [facility] your doctor now or seek immediate medical care if:    · You have new or worse symptoms in your legs or buttocks. Symptoms may include:  ? Numbness or tingling. ? Weakness. ? Pain.     · You lose bladder or bowel control.    Watch closely for changes in your health, and be sure to contact your doctor if:    · You are not getting better as expected. Where can you learn more? Go to http://earl-molly.info/. Enter Y245 in the search box to learn more about \"Coccyx Pain: Care Instructions. \"  Current as of: September 23, 2018  Content Version: 11.9  © 7058-9137 [employer]. Care instructions adapted under license by [employer] (which disclaims liability or warranty for this information).  If you have questions about a medical condition or this instruction, always ask your healthcare professional. [address] any warranty or liability for your use of this information.

## 2019-02-22 NOTE — PATIENT INSTRUCTIONS
Anxiety Disorder: Care Instructions  Your Care Instructions    Anxiety is a normal reaction to stress. Difficult situations can cause you to have symptoms such as sweaty palms and a nervous feeling. In an anxiety disorder, the symptoms are far more severe. Constant worry, muscle tension, trouble sleeping, nausea and diarrhea, and other symptoms can make normal daily activities difficult or impossible. These symptoms may occur for no reason, and they can affect your work, school, or social life. Medicines, counseling, and self-care can all help. Follow-up care is a key part of your treatment and safety. Be sure to make and go to all appointments, and call your doctor if you are having problems. It's also a good idea to know your test results and keep a list of the medicines you take. How can you care for yourself at home? · Take medicines exactly as directed. Call your doctor if you think you are having a problem with your medicine. · Go to your counseling sessions and follow-up appointments. · Recognize and accept your anxiety. Then, when you are in a situation that makes you anxious, say to yourself, \"This is not an emergency. I feel uncomfortable, but I am not in danger. I can keep going even if I feel anxious. \"  · Be kind to your body:  ? Relieve tension with exercise or a massage. ? Get enough rest.  ? Avoid alcohol, caffeine, nicotine, and illegal drugs. They can increase your anxiety level and cause sleep problems. ? Learn and do relaxation techniques. See below for more about these techniques. · Engage your mind. Get out and do something you enjoy. Go to a funny movie, or take a walk or hike. Plan your day. Having too much or too little to do can make you anxious. · Keep a record of your symptoms. Discuss your fears with a good friend or family member, or join a support group for people with similar problems. Talking to others sometimes relieves stress.   · Get involved in social groups, or volunteer to help others. Being alone sometimes makes things seem worse than they are. · Get at least 30 minutes of exercise on most days of the week to relieve stress. Walking is a good choice. You also may want to do other activities, such as running, swimming, cycling, or playing tennis or team sports. Relaxation techniques  Do relaxation exercises 10 to 20 minutes a day. You can play soothing, relaxing music while you do them, if you wish. · Tell others in your house that you are going to do your relaxation exercises. Ask them not to disturb you. · Find a comfortable place, away from all distractions and noise. · Lie down on your back, or sit with your back straight. · Focus on your breathing. Make it slow and steady. · Breathe in through your nose. Breathe out through either your nose or mouth. · Breathe deeply, filling up the area between your navel and your rib cage. Breathe so that your belly goes up and down. · Do not hold your breath. · Breathe like this for 5 to 10 minutes. Notice the feeling of calmness throughout your whole body. As you continue to breathe slowly and deeply, relax by doing the following for another 5 to 10 minutes:  · Tighten and relax each muscle group in your body. You can begin at your toes and work your way up to your head. · Imagine your muscle groups relaxing and becoming heavy. · Empty your mind of all thoughts. · Let yourself relax more and more deeply. · Become aware of the state of calmness that surrounds you. · When your relaxation time is over, you can bring yourself back to alertness by moving your fingers and toes and then your hands and feet and then stretching and moving your entire body. Sometimes people fall asleep during relaxation, but they usually wake up shortly afterward. · Always give yourself time to return to full alertness before you drive a car or do anything that might cause an accident if you are not fully alert.  Never play a relaxation tape while you drive a car. When should you call for help? Call 911 anytime you think you may need emergency care. For example, call if:    · You feel you cannot stop from hurting yourself or someone else.   Dandre Miranda the numbers for these national suicide hotlines: 4-737-560-TALK (5-903.474.4255) and 1-994-MANPRCV (9-576.652.7674). If you or someone you know talks about suicide or feeling hopeless, get help right away.   Watch closely for changes in your health, and be sure to contact your doctor if:    · You have anxiety or fear that affects your life.     · You have symptoms of anxiety that are new or different from those you had before. Where can you learn more? Go to http://earlSocialmothmolly.info/. Enter P754 in the search box to learn more about \"Anxiety Disorder: Care Instructions. \"  Current as of: September 11, 2018  Content Version: 11.9  © [id number] [employer]. Care instructions adapted under license by [employer] (which disclaims liability or warranty for this information). If you have questions about a medical condition or this instruction, always ask your healthcare professional. [place] [address] any warranty or liability for your use of this information. Influenza (Flu): Care Instructions  Your Care Instructions    Influenza (flu) is an infection in the lungs and breathing passages. It is caused by the influenza virus. There are different strains, or types, of the flu virus from year to year. Unlike the common cold, the flu comes on suddenly and the symptoms, such as a cough, congestion, fever, chills, fatigue, aches, and pains, are more severe. These symptoms may last up to 10 days. Although the flu can make you feel very sick, it usually doesn't cause serious health problems. Home treatment is usually all you need for flu symptoms.  But your doctor may prescribe antiviral medicine to prevent other health problems, such as pneumonia, from developing. Older people and those who have a long-term health condition, such as lung disease, are most at risk for having pneumonia or other health problems. Follow-up care is a key part of your treatment and safety. Be sure to make and go to all appointments, and call your doctor if you are having problems. It's also a good idea to know your test results and keep a list of the medicines you take. How can you care for yourself at home? · Get plenty of rest.  · Drink plenty of fluids, enough so that your urine is light yellow or clear like water. If you have kidney, heart, or liver disease and have to limit fluids, talk with your doctor before you increase the amount of fluids you drink. · Take an over-the-counter pain medicine if needed, such as acetaminophen (Tylenol), ibuprofen (Advil, Motrin), or naproxen (Aleve), to relieve fever, headache, and muscle aches. Read and follow all instructions on the label. No one younger than 20 should take aspirin. It has been linked to Reye syndrome, a serious illness. · Do not smoke. Smoking can make the flu worse. If you need help quitting, talk to your doctor about stop-smoking programs and medicines. These can increase your chances of quitting for good. · Breathe moist air from a hot shower or from a sink filled with hot water to help clear a stuffy nose. · Before you use cough and cold medicines, check the label. These medicines may not be safe for young children or for people with certain health problems. · If the skin around your nose and lips becomes sore, put some petroleum jelly on the area. · To ease coughing:  ? Drink fluids to soothe a scratchy throat. ? Suck on cough drops or plain hard candy. ? Take an over-the-counter cough medicine that contains dextromethorphan to help you get some sleep. Read and follow all instructions on the label. ? Raise your head at night with an extra pillow. This may help you rest if coughing keeps you awake.   · Take any prescribed medicine exactly as directed. Call your doctor if you think you are having a problem with your medicine. To avoid spreading the flu  · Wash your hands regularly, and keep your hands away from your face. · Stay home from school, work, and other public places until you are feeling better and your fever has been gone for at least 24 hours. The fever needs to have gone away on its own without the help of medicine. · Ask people living with you to talk to their doctors about preventing the flu. They may get antiviral medicine to keep from getting the flu from you. · To prevent the flu in the future, get a flu vaccine every fall. Encourage people living with you to get the vaccine. · Cover your mouth when you cough or sneeze. When should you call for help? Call 911 anytime you think you may need emergency care. For example, call if:    · You have severe trouble breathing.    Call your doctor now or seek immediate medical care if:    · You have new or worse trouble breathing.     · You seem to be getting much sicker.     · You feel very sleepy or confused.     · You have a new or higher fever.     · You get a new rash.    Watch closely for changes in your health, and be sure to contact your doctor if:    · You begin to get better and then get worse.     · You are not getting better after 1 week. Where can you learn more? Go to http://earl-molly.info/. Enter A026 in the search box to learn more about \"Influenza (Flu): Care Instructions. \"  Current as of: September 5, 2018  Content Version: 11.9  © 8210-5412 [employer]. Care instructions adapted under license by [employer] (which disclaims liability or warranty for this information). If you have questions about a medical condition or this instruction, always ask your healthcare professional. [place] [address] any warranty or liability for your use of this information.          Learning About Mood Disorders  What are mood disorders? Mood disorders are medical problems that affect how you feel. They can impact your moods, thoughts, and actions. Mood disorders include:  · Depression. This causes you to feel sad or hopeless for much of the time. · Bipolar disorder. This causes extreme mood changes from manic episodes of very high energy to extreme lows of depression. · Seasonal affective disorder (SAD). This is a type of depression that affects you during the same season each year. Most often people experience SAD during the fall and winter months when days are shorter and there is less light. What are the symptoms? Depression  You may:  · Feel sad or hopeless nearly every day. · Lose interest in or not get pleasure from most daily activities. You feel this way nearly every day. · Have low energy, changes in your appetite, or changes in how well you sleep. · Have trouble concentrating. · Think about death and suicide. Keep the numbers for these national suicide hotlines: 5-532-748-TALK (8-822.695.4661) and 3-220-ALWKLPP (1-455.386.6198). If you or someone you know talks about suicide or feeling hopeless, get help right away. Bipolar disorder  Symptoms depend on your mood swings. You may:  · Feel very happy, energetic, or on edge. · Feel like you need very little sleep. · Feel overly self-confident. · Do impulsive things, such as spending a lot of money. · Feel sad or hopeless. · Have racing thoughts or trouble thinking and making decisions. · Lose interest in things you have enjoyed in the past.  · Think about death and suicide. Keep the numbers for these national suicide hotlines: 1-011-788-TALK (7-310.995.9299) and 9-392-UTPTZJW (6-205.294.6640). If you or someone you know talks about suicide or feeling hopeless, get help right away. Seasonal affective disorder (SAD)  Symptoms come and go at about the same time each year.  For most people with SAD, symptoms come during the winter when there is less daylight. You may:  · Feel sad, grumpy, mariano, or anxious. · Lose interest in your usual activities. · Eat more and crave carbohydrates, such as bread and pasta. · Gain weight. · Sleep more and feel drowsy during the daytime. How are mood disorders treated? Mood disorders can be treated with medicines or counseling, or a combination of both. Medicines for depression and SAD may include antidepressants. Medicines for bipolar disorder may include:  · Mood stabilizers. · Antipsychotics. · Benzodiazepines. Counseling may involve cognitive-behavioral therapy. It teaches you how to change the ways you think and behave. This can help you stop thinking bad thoughts about yourself and your life. Light therapy is the main treatment for SAD. This therapy uses a special kind of lamp. You let the lamp shine on you at certain times, usually in the morning. This may help your symptoms during the months when there is less sunlight. Healthy lifestyle  Healthy lifestyle changes may help you feel better. · Get at least 30 minutes of exercise on most days of the week. Walking is a good choice. · Eat a healthy diet. Include fruits, vegetables, lean proteins, and whole grains in your diet each day. · Keep a regular sleep schedule. Try for 8 hours of sleep a night. · Find ways to manage stress, such as relaxation exercises. · Avoid alcohol and illegal drugs. Follow-up care is a key part of your treatment and safety. Be sure to make and go to all appointments, and call your doctor if you are having problems. It's also a good idea to know your test results and keep a list of the medicines you take. Where can you learn more? Go to http://earl-molly.info/. Enter Z399 in the search box to learn more about \"Learning About Mood Disorders. \"  Current as of: September 11, 2018  Content Version: 11.9  © 6850-8383 [employer].  Care instructions adapted under license by [employer] (which disclaims liability or warranty for this information). If you have questions about a medical condition or this instruction, always ask your healthcare professional. [address] any warranty or liability for your use of this information.

## 2019-02-22 NOTE — PROGRESS NOTES
Reason for Visit/HPI:   
Debbie Hughes is a 20 y.o. female patient who presents today for Chief Complaint Patient presents with  Cough Patient being seen for cough, sore throat, headache, fever, All started Tuesday. Diagnosis/Treatment Plan:   
Diagnoses and all orders for this visit: 
 
1. Chest congestion Comments: 
New onset of chest congestions in the last 3 days, now has a fever, fatigue, malaise with cough, runny nose. Orders: -     AMB POC MADDISON INFLUENZA A/B TEST 
-     oseltamivir (TAMIFLU) 75 mg capsule; Take 1 Cap by mouth two (2) times a day for 5 days. -     amoxicillin (AMOXIL) 400 mg/5 mL suspension; Take 21.1 mL by mouth two (2) times a day for 8 days. 
-     magic mouthwash solution; Take 5 mL by mouth three (3) times daily as needed for Pain for up to 5 days. Magic mouth wash Maalox Lidocaine 2% viscous Diphenhydramine oral solution Pharmacy to mix equal portions of ingredients to a total volume as indicated in the dispense amount. -     Codeine-guaiFENesin 6.3-100 mg/5 mL liqd; Take 2.5-5 mL by mouth every twelve (12) hours as needed for up to 5 days. Max Daily Amount: 10 mL. 2. Flu Comments: 
Flu A positive in the office today. We will start treatment with Tamiflu. Orders: -     AMB POC MADDISON INFLUENZA A/B TEST 
-     oseltamivir (TAMIFLU) 75 mg capsule; Take 1 Cap by mouth two (2) times a day for 5 days. -     amoxicillin (AMOXIL) 400 mg/5 mL suspension; Take 21.1 mL by mouth two (2) times a day for 8 days. 
-     magic mouthwash solution; Take 5 mL by mouth three (3) times daily as needed for Pain for up to 5 days. Magic mouth wash Maalox Lidocaine 2% viscous Diphenhydramine oral solution Pharmacy to mix equal portions of ingredients to a total volume as indicated in the dispense amount. -     Codeine-guaiFENesin 6.3-100 mg/5 mL liqd; Take 2.5-5 mL by mouth every twelve (12) hours as needed for up to 5 days. Max Daily Amount: 10 mL. 3. Strep sore throat Comments: 
Unable to send a Strep Throat swab or test in office. She has white pustual pockets on the back of her throat. Orders: -     AMB POC MADDISON INFLUENZA A/B TEST 
-     oseltamivir (TAMIFLU) 75 mg capsule; Take 1 Cap by mouth two (2) times a day for 5 days. -     amoxicillin (AMOXIL) 400 mg/5 mL suspension; Take 21.1 mL by mouth two (2) times a day for 8 days. 
-     magic mouthwash solution; Take 5 mL by mouth three (3) times daily as needed for Pain for up to 5 days. Magic mouth wash Maalox Lidocaine 2% viscous Diphenhydramine oral solution Pharmacy to mix equal portions of ingredients to a total volume as indicated in the dispense amount. -     Codeine-guaiFENesin 6.3-100 mg/5 mL liqd; Take 2.5-5 mL by mouth every twelve (12) hours as needed for up to 5 days. Max Daily Amount: 10 mL. 4. Fever in adult Comments: 
Elevated fever in office today. She was given 2 es Tylenol while waiting for plan of care. Orders: -     AMB POC MADDISON INFLUENZA A/B TEST 
-     oseltamivir (TAMIFLU) 75 mg capsule; Take 1 Cap by mouth two (2) times a day for 5 days. -     amoxicillin (AMOXIL) 400 mg/5 mL suspension; Take 21.1 mL by mouth two (2) times a day for 8 days. 
-     magic mouthwash solution; Take 5 mL by mouth three (3) times daily as needed for Pain for up to 5 days. Magic mouth wash Maalox Lidocaine 2% viscous Diphenhydramine oral solution Pharmacy to mix equal portions of ingredients to a total volume as indicated in the dispense amount. -     Codeine-guaiFENesin 6.3-100 mg/5 mL liqd; Take 2.5-5 mL by mouth every twelve (12) hours as needed for up to 5 days. Max Daily Amount: 10 mL. 5. Anxiety Comments: She was not able to tolerate the Cymbalta as she had too many side effects to tolerate. Discussed trying other medications she is open to. Orders: 
-     escitalopram oxalate (LEXAPRO) 5 mg tablet;  Take 1 Tab by mouth daily for 14 days, THEN 2 Tabs daily for 14 days. 6. Severe episode of recurrent major depressive disorder, without psychotic features ([facility] [address] Comments: 
See #5. She continues to have symptoms of clinical depression. She is open to trying other medications for treatment. Orders: 
-     escitalopram oxalate (LEXAPRO) 5 mg tablet; Take 1 Tab by mouth daily for 14 days, THEN 2 Tabs daily for 14 days. 7. Encounter to discuss test results Comments: We reviewed her labs today in office. They are excellent. Orders: -     FULL CODE 
 
8. Full code status 
-     escitalopram oxalate (LEXAPRO) 5 mg tablet; Take 1 Tab by mouth daily for 14 days, THEN 2 Tabs daily for 14 days. 
-     FULL CODE Discontinued Care: The following treatment modalities have been discontinued by the provider today:  
Medications Discontinued During This Encounter Medication Reason  DULoxetine (CYMBALTA) 30 mg capsule Clinically Ineffective  predniSONE (DELTASONE) 20 mg tablet Therapy Completed Follow Up: Follow-up Disposition: 
Return in about 2 weeks (around 3/8/2019) for with Tom Maloney NP after starting your Lexapro to discuss the effects. Subjective: No Known Allergies Prior to Admission medications Medication Sig Start Date End Date Taking? Authorizing Provider  
oseltamivir (TAMIFLU) 75 mg capsule Take 1 Cap by mouth two (2) times a day for 5 days. 2/22/19 2/27/19 Yes Manpreet Rob NP  
amoxicillin (AMOXIL) 400 mg/5 mL suspension Take 21.1 mL by mouth two (2) times a day for 8 days. 2/22/19 3/2/19 Yes Manpreet Rob NP  
magic mouthwash solution Take 5 mL by mouth three (3) times daily as needed for Pain for up to 5 days. Magic mouth wash Maalox Lidocaine 2% viscous Diphenhydramine oral solution Pharmacy to mix equal portions of ingredients to a total volume as indicated in the dispense amount.  2/22/19 2/27/19 Yes Manpreet Rob NP  
 Codeine-guaiFENesin 6.3-100 mg/5 mL liqd Take 2.5-5 mL by mouth every twelve (12) hours as needed for up to 5 days. Max Daily Amount: 10 mL. 2/22/19 2/27/19 Yes Keisha Finney NP  
escitalopram oxalate (LEXAPRO) 5 mg tablet Take 1 Tab by mouth daily for 14 days, THEN 2 Tabs daily for 14 days. 2/22/19 3/22/19 Yes Keisha Finney NP  
ibuprofen (MOTRIN) 400 mg tablet Take  by mouth every six (6) hours as needed for Pain. Yes Provider, Historical  
traMADol-acetaminophen (ULTRACET) 37.5-325 mg per tablet Take 1 Tab by mouth every six (6) hours as needed for Pain. Max Daily Amount: 6 Tabs. 2/8/19   Keisha Finney NP  
dimethicone ([facility] [facility] PM) 2 % topical cream Apply to scars once daily until healed. 12/3/18   Keisha Finney NP Past Medical History:  
Diagnosis Date  Anxiety 7/27/2017  Asthma  Bertolotti's syndrome  Depression  GERD (gastroesophageal reflux disease)  Migraines   
 migraines Past Surgical History:  
Procedure Laterality Date  DERMATOLOGICAL PROCEDURE  2002  
 purple lesion removed from lower back  HX BACK SURGERY  11/06/2018  HX LUMBAR DISKECTOMY L4 L5 Social History Tobacco Use  Smoking status: Never Smoker  Smokeless tobacco: Never Used Substance Use Topics  Alcohol use: No  
 Drug use: No  
  
Family History Problem Relation Age of Onset  Arthritis-rheumatoid Father  Other Father   
     muscular neuropathy  Hypertension Maternal Grandfather  Cancer Paternal Grandfather   
     bone marrow Advance Care Planning 2/8/2019 Patient's Healthcare Decision Maker is: Verbal statement (Legal Next of Kin remains as decision maker) Confirm Advance Directive None Patient Would Like to Complete Advance Directive No  
 
Test Results:  
 
Office Visit on 02/22/2019 Component Date Value Ref Range Status  VALID INTERNAL CONTROL POC 02/22/2019 Yes   Final  
  Influenza A Ag POC 02/22/2019 Positive  Negative Pos/Neg Final  
 Influenza B Ag POC 02/22/2019 Negative  Negative Pos/Neg Final  
Office Visit on 02/08/2019 Component Date Value Ref Range Status  WBC 02/08/2019 4.5  3.4 - 10.8 x10E3/uL Final  
 RBC 02/08/2019 4.05  3.77 - 5.28 x10E6/uL Final  
 HGB 02/08/2019 12.7  11.1 - 15.9 g/dL Final  
 HCT 02/08/2019 37.4  34.0 - 46.6 % Final  
 MCV 02/08/2019 92  79 - 97 fL Final  
 MCH 02/08/2019 31.4  26.6 - 33.0 pg Final  
 MCHC 02/08/2019 34.0  31.5 - 35.7 g/dL Final  
 RDW 02/08/2019 12.9  12.3 - 15.4 % Final  
 PLATELET 10/94/0209 561  150 - 379 x10E3/uL Final  
 NEUTROPHILS 02/08/2019 57  Not Estab. % Final  
 Lymphocytes 02/08/2019 33  Not Estab. % Final  
 MONOCYTES 02/08/2019 7  Not Estab. % Final  
 EOSINOPHILS 02/08/2019 3  Not Estab. % Final  
 BASOPHILS 02/08/2019 0  Not Estab. % Final  
 ABS. NEUTROPHILS 02/08/2019 2.6  1.4 - 7.0 x10E3/uL Final  
 Abs Lymphocytes 02/08/2019 1.5  0.7 - 3.1 x10E3/uL Final  
 ABS. MONOCYTES 02/08/2019 0.3  0.1 - 0.9 x10E3/uL Final  
 ABS. EOSINOPHILS 02/08/2019 0.1  0.0 - 0.4 x10E3/uL Final  
 ABS. BASOPHILS 02/08/2019 0.0  0.0 - 0.2 x10E3/uL Final  
 IMMATURE GRANULOCYTES 02/08/2019 0  Not Estab. % Final  
 ABS. IMM.  GRANS. 02/08/2019 0.0  0.0 - 0.1 x10E3/uL Final  
 Glucose 02/08/2019 83  65 - 99 mg/dL Final  
 BUN 02/08/2019 6  6 - 20 mg/dL Final  
 Creatinine 02/08/2019 0.80  0.57 - 1.00 mg/dL Final  
 GFR est non-AA 02/08/2019 106  >59 mL/min/1.73 Final  
 GFR est AA 02/08/2019 123  >59 mL/min/1.73 Final  
 BUN/Creatinine ratio 02/08/2019 8* 9 - 23 Final  
 Sodium 02/08/2019 142  134 - 144 mmol/L Final  
 Potassium 02/08/2019 3.6  3.5 - 5.2 mmol/L Final  
 Chloride 02/08/2019 105  96 - 106 mmol/L Final  
 CO2 02/08/2019 20  20 - 29 mmol/L Final  
 Calcium 02/08/2019 9.1  8.7 - 10.2 mg/dL Final  
 Protein, total 02/08/2019 6.6  6.0 - 8.5 g/dL Final  
  Albumin 02/08/2019 4.4  3.5 - 5.5 g/dL Final  
 GLOBULIN, TOTAL 02/08/2019 2.2  1.5 - 4.5 g/dL Final  
 A-G Ratio 02/08/2019 2.0  1.2 - 2.2 Final  
 Bilirubin, total 02/08/2019 0.7  0.0 - 1.2 mg/dL Final  
 Alk. phosphatase 02/08/2019 61  39 - 117 IU/L Final  
 AST (SGOT) 02/08/2019 16  0 - 40 IU/L Final  
 ALT (SGPT) 02/08/2019 6  0 - 32 IU/L Final  
 Hemoglobin A1c 02/08/2019 5.0  4.8 - 5.6 % Final  
 Comment:          Prediabetes: 5.7 - 6.4 Diabetes: >6.4 Glycemic control for adults with diabetes: <7.0  Estimated average glucose 02/08/2019 97  mg/dL Final  
 Cholesterol, total 02/08/2019 131  100 - 199 mg/dL Final  
 Triglyceride 02/08/2019 105  0 - 149 mg/dL Final  
 HDL Cholesterol 02/08/2019 57  >39 mg/dL Final  
 VLDL, calculated 02/08/2019 21  5 - 40 mg/dL Final  
 LDL, calculated 02/08/2019 53  0 - 99 mg/dL Final  
 Calcitriol (Vit D 1, 25 di-OH) 02/08/2019 62.1  19.9 - 79.3 pg/mL Final  
 Vitamin B12 02/08/2019 527  232 - 1,245 pg/mL Final  
 Folate 02/08/2019 >20.0  >3.0 ng/mL Final  
 Comment: A serum folate concentration of less than 3.1 ng/mL is 
considered to represent clinical deficiency.  TSH 02/08/2019 4.180  0.450 - 4.500 uIU/mL Final  
 Amphetamines, urine 02/08/2019 Negative  Alqfor=9439 ng/mL Final  
 Amphetamine test includes Amphetamine and Methamphetamine.  MDMA 02/08/2019 Negative  Ndjoaj=555 ng/mL Final  
 Barbiturates 02/08/2019 Negative  Rumgah=727 ng/mL Final  
 Benzodiazepines 02/08/2019 Negative  Dvsglv=279 ng/mL Final  
 Cannabinoids 02/08/2019 Negative  Cutoff=50 ng/mL Final  
 Cocaine 02/08/2019 Negative  Zylgxq=094 ng/mL Final  
 Methaqualone 02/08/2019 Negative  Iyynch=553 ng/mL Final  
 Opiates 02/08/2019 Negative  Uxcdrp=112 ng/mL Final  
 Opiate test includes Codeine and Morphine only.   
 Phencyclidine 02/08/2019 Negative  Cutoff=25 ng/mL Final  
 Methadone Screen, Urine 02/08/2019 Negative  Nscrar=733 ng/mL Final  
  PROPOXYPHENE, URINE 02/08/2019 Negative  Jkgdpw=313 ng/mL Final  
 Specific Gravity 02/08/2019 1.007  1.005 - 1.030 Final  
 pH (UA) 02/08/2019 6.0  5.0 - 7.5 Final  
 Color 02/08/2019 Yellow  Yellow Final  
 Appearance 02/08/2019 Clear  Clear Final  
 Leukocyte Esterase 02/08/2019 Negative  Negative Final  
 Protein 02/08/2019 Negative  Negative/Trace Final  
 Glucose 02/08/2019 Negative  Negative Final  
 Ketone 02/08/2019 Negative  Negative Final  
 Blood 02/08/2019 Negative  Negative Final  
 Bilirubin 02/08/2019 Negative  Negative Final  
 Urobilinogen 02/08/2019 0.2  0.2 - 1.0 mg/dL Final  
 Nitrites 02/08/2019 Negative  Negative Final  
 Microscopic Examination 02/08/2019 Comment   Final  
 Microscopic not indicated and not performed.  Pregnancy test, urine 02/08/2019 Negative  Negative Final  
 
 
Objective:  
 
Vitals:  
 02/22/19 1141 BP: 111/78 Pulse: 79 Resp: 16 Temp: 99.1 °F (37.3 °C) TempSrc: Oral  
SpO2: 98% Weight: 148 lb 8 oz (67.4 kg) Height: 5' 4\" (1.626 m) Wt Readings from Last 3 Encounters:  
02/22/19 148 lb 8 oz (67.4 kg) 02/08/19 151 lb (68.5 kg) 12/03/18 148 lb (67.1 kg) (78 %, Z= 0.78)* * Growth percentiles are based on CDC (Girls, 2-20 Years) data. BP Readings from Last 3 Encounters:  
02/22/19 111/78  
02/08/19 124/74  
12/03/18 111/73 (41 %, Z = -0.24 /  80 %, Z = 0.86)* *BP percentiles are based on the August 2017 AAP Clinical Practice Guideline for girls Review of Systems Constitutional: Positive for activity change, fatigue and fever. Negative for appetite change. HENT: Positive for ear pain, sore throat and trouble swallowing. Negative for congestion, dental problem, hearing loss, postnasal drip, sinus pressure and sneezing. Eyes: Negative for discharge, redness and visual disturbance. Respiratory: Positive for cough and chest tightness. Negative for apnea, shortness of breath and wheezing. Cardiovascular: Negative for chest pain, palpitations and leg swelling. Gastrointestinal: Negative for abdominal distention, abdominal pain, blood in stool, constipation, diarrhea, nausea and vomiting. Endocrine: Negative for polydipsia, polyphagia and polyuria. Genitourinary: Negative for flank pain, frequency and urgency. Musculoskeletal: Positive for arthralgias, back pain and gait problem. Negative for joint swelling and neck pain. Skin: Negative for color change, pallor, rash and wound. Allergic/Immunologic: Negative for environmental allergies and food allergies. Neurological: Positive for dizziness. Negative for tremors, weakness, light-headedness, numbness and headaches. Hematological: Negative for adenopathy. Psychiatric/Behavioral: Positive for decreased concentration. Negative for agitation, confusion and sleep disturbance. The patient is nervous/anxious. Physical Exam  
Constitutional: She is oriented to person, place, and time and well-developed, well-nourished, and in no distress. Vital signs are normal. She appears to not be writhing in pain, not malnourished and not dehydrated. She appears healthy. She does not have a sickly appearance. No distress. HENT:  
Head: Normocephalic and atraumatic. Right Ear: Hearing, tympanic membrane, external ear and ear canal normal.  
Left Ear: Hearing, tympanic membrane and ear canal normal. There is tenderness. Nose: Nose normal.  
Mouth/Throat: Uvula is midline, oropharynx is clear and moist and mucous membranes are normal. Mucous membranes are not pale, not dry and not cyanotic. No oral lesions. No uvula swelling. No posterior oropharyngeal edema or posterior oropharyngeal erythema. Bilateral TM's are intact. No signs of cerumen or cause for Left ear pain. No signs of trauma or injury. Pain is not reproducible.    
Eyes: Conjunctivae, EOM and lids are normal. Pupils are equal, round, and reactive to light. Lids are everted and swept, no foreign bodies found. Neck: Trachea normal. Neck supple. Decreased carotid pulses and hepatojugular reflux present. No JVD present. Spinous process tenderness and muscular tenderness present. Erythema and decreased range of motion present. No thyromegaly present. Chronic ongoing. Dx with Flat Neck Syndrome. Cardiovascular: Normal rate, regular rhythm, S1 normal, S2 normal, normal heart sounds, intact distal pulses and normal pulses. Exam reveals no gallop and no friction rub. No murmur heard. No extremity edema is present during today's exam.   
Pulmonary/Chest: Effort normal and breath sounds normal.  
Abdominal: Soft. Normal appearance and bowel sounds are normal. She exhibits no distension. There is no hepatosplenomegaly. There is no tenderness. There is no CVA tenderness. Musculoskeletal:  
     Cervical back: She exhibits decreased range of motion, tenderness, bony tenderness, pain and spasm. Lumbar back: She exhibits decreased range of motion, tenderness, swelling, edema, pain and spasm. Generalized chronic extremity weakness is present. Neurological: She is alert and oriented to person, place, and time. She has normal reflexes and intact cranial nerves. She displays weakness and atrophy. A sensory deficit is present. She exhibits abnormal muscle tone. Gait normal. Coordination and gait normal. GCS score is 15. Skin: Skin is warm, dry and intact. No bruising and no rash noted. She is not diaphoretic. No cyanosis or erythema. No pallor. Nails show no clubbing. See Musculator Skeletal Exam.   
Psychiatric: Memory normal. Her mood appears anxious. Her affect is labile. She expresses impulsivity. She exhibits a depressed mood. She is apathetic. She exhibits disordered thought content. She has a flat affect. Disclaimer: Ms. Chelsie Martinez has been advised to call or return to our office if symptoms worsen/change/persist. We as a care team including the patient; discussed expected course/resolution/complications of diagnosis in detail today. Medication risks/benefits/costs/interactions/alternatives discussed Yair Curtis was given an after visit summary which includes diagnoses, current medications, & vitals. Yair Curtis expressed understanding with the diagnosis and plan.

## 2019-02-22 NOTE — ACP (ADVANCE CARE PLANNING)
Advance Care Planning 2/8/2019   Patient's Healthcare Decision Maker is: Verbal statement (Legal Next of Kin remains as decision maker)   Confirm Advance Directive None   Patient Would Like to Complete Advance Directive No

## 2019-02-24 ENCOUNTER — TELEPHONE (OUTPATIENT)
Dept: GERIATRIC MEDICINE | Age: 19
End: 2019-02-24

## 2019-02-24 DIAGNOSIS — Z02.89 ENCOUNTER TO OBTAIN EXCUSE FROM SCHOOL: Primary | ICD-10-CM

## 2019-02-24 NOTE — LETTER
NOTIFICATION / EXCUSE NOTE DUE TO ILLNESS 
2/24/2019 7:03 PM 
 
Ms. Ellie Krishnan [address] [facility] [id number] To Whom It May Concern: Ellie Krishnan is currently under the care of the [facility]. She should be excused from school from Friday 2/22/19 thru Tuesday 2/26/2019 due to illness. If there are questions or concerns please have the patient contact our office.  
 
 
 
Sincerely, 
 
 
Andrea Hernandez NP

## 2019-03-19 ENCOUNTER — OFFICE VISIT (OUTPATIENT)
Dept: GERIATRIC MEDICINE | Age: 19
End: 2019-03-19

## 2019-03-19 VITALS
RESPIRATION RATE: 18 BRPM | DIASTOLIC BLOOD PRESSURE: 72 MMHG | HEIGHT: 64 IN | BODY MASS INDEX: 25.42 KG/M2 | OXYGEN SATURATION: 100 % | TEMPERATURE: 99 F | HEART RATE: 78 BPM | SYSTOLIC BLOOD PRESSURE: 118 MMHG | WEIGHT: 148.9 LBS

## 2019-03-19 DIAGNOSIS — F50.81 BINGE-EATING DISORDER, MODERATE: ICD-10-CM

## 2019-03-19 DIAGNOSIS — R50.9 FEVER IN ADULT: Primary | ICD-10-CM

## 2019-03-19 DIAGNOSIS — J06.0 SORE THROAT AND LARYNGITIS: ICD-10-CM

## 2019-03-19 DIAGNOSIS — F33.2 SEVERE EPISODE OF RECURRENT MAJOR DEPRESSIVE DISORDER, WITHOUT PSYCHOTIC FEATURES (HCC): ICD-10-CM

## 2019-03-19 DIAGNOSIS — R09.81 CONGESTION OF NASAL SINUS: ICD-10-CM

## 2019-03-19 DIAGNOSIS — J30.1 SEASONAL ALLERGIC RHINITIS DUE TO POLLEN: ICD-10-CM

## 2019-03-19 DIAGNOSIS — R45.89 FEELING HOPELESS: ICD-10-CM

## 2019-03-19 LAB
FLUAV+FLUBV AG NOSE QL IA.RAPID: NEGATIVE POS/NEG
FLUAV+FLUBV AG NOSE QL IA.RAPID: NEGATIVE POS/NEG
VALID INTERNAL CONTROL?: YES

## 2019-03-19 RX ORDER — CETIRIZINE HCL 10 MG
10 TABLET ORAL DAILY
Qty: 90 TAB | Refills: 1
Start: 2019-03-19

## 2019-03-19 RX ORDER — BENZOCAINE .13; .15; .5; 2 G/100G; G/100G; G/100G; G/100G
2 GEL ORAL DAILY
Qty: 8.6 G | Refills: 3
Start: 2019-03-19

## 2019-03-19 NOTE — PROGRESS NOTES
Reason for Visit/HPI:   
Jazmyne Pathak is a 20 y.o. female patient who presents today for Chief Complaint Patient presents with  Fever Pt being seen for fever chills that started saturday night.  Sore Throat  
  sore throat  Cough Cough with headache. Follow Up: Follow-up Disposition: 
Return in about 1 month (around 4/19/2019) for discuss depression and anxiety, would love to hear you started the Lexpro. Diagnosis/Treatment Plan:   
Diagnoses and all orders for this visit: 1. Fever in adult Comments: 
She has had a low grade fever for the past 3-4 days with nasal congestion, sore throat, and fatigue. Orders: -     AMB POC MADDISON INFLUENZA A/B TEST 2. Sore throat and laryngitis Comments: 
Started about 3-4 days ago. Her nose is congested. She is not noticing any post nasal drip but states her throat is burning. Orders: -     AMB POC MADDISON INFLUENZA A/B TEST 
-     cetirizine (ZYRTEC) 10 mg tablet; Take 1 Tab by mouth daily. -     budesonide (RHINOCORT AQUA) 32 mcg/actuation nasal spray; 2 Sprays by Both Nostrils route daily. 3. Congestion of nasal sinus Comments: 
Allergic Orders: -     AMB POC MADDISON INFLUENZA A/B TEST 
-     cetirizine (ZYRTEC) 10 mg tablet; Take 1 Tab by mouth daily. -     budesonide (RHINOCORT AQUA) 32 mcg/actuation nasal spray; 2 Sprays by Both Nostrils route daily. 4. Seasonal allergic rhinitis due to pollen Comments: 
I believe this is realted to allergies. She is negative for the flu as well as I do not see anything in the back of her throat that would be suspected of strep. Orders: -     AMB POC MADDISON INFLUENZA A/B TEST 
-     cetirizine (ZYRTEC) 10 mg tablet; Take 1 Tab by mouth daily. -     budesonide (RHINOCORT AQUA) 32 mcg/actuation nasal spray; 2 Sprays by Both Nostrils route daily. 5. Severe episode of recurrent major depressive disorder, without psychotic features ([facility] [address] [facility] Comments: Chronic, ongoing. She has not started the medications I prescribed about 2 months ago. She state she does not want to be dependent on medications. 6. Feeling hopeless Comments: 
Pt continues feel hopeless. She has not started the Lexapro I prescribed 2 months ago. She states she is using talk therapy but symptoms are present. 7. Binge-eating disorder, moderate Comments: 
Pt admits she is having a difficult time with her binge eating disorder. She is trying to control it but states she is struggling at times. Discontinued Care: The following treatment modalities have been discontinued by the provider today:  
Medications Discontinued During This Encounter Medication Reason  dimethicone (MEDERMA PM) 2 % topical cream Therapy Completed Subjective: No Known Allergies Prior to Admission medications Medication Sig Start Date End Date Taking? Authorizing Provider  
cetirizine (ZYRTEC) 10 mg tablet Take 1 Tab by mouth daily. 3/19/19  Yes Eunice Strickland NP  
budesonide (RHINOCORT AQUA) 32 mcg/actuation nasal spray 2 Sprays by Both Nostrils route daily. 3/19/19  Yes Eunice Strickland NP  
traMADol-acetaminophen (ULTRACET) 37.5-325 mg per tablet Take 1 Tab by mouth every six (6) hours as needed for Pain. Max Daily Amount: 6 Tabs. 2/8/19  Yes Eunice Strickland NP  
ibuprofen (MOTRIN) 400 mg tablet Take  by mouth every six (6) hours as needed for Pain. Yes Provider, Historical  
escitalopram oxalate (LEXAPRO) 5 mg tablet Take 1 Tab by mouth daily for 14 days, THEN 2 Tabs daily for 14 days. 2/22/19 3/22/19  Eunice Strickland NP Past Medical History:  
Diagnosis Date  Anxiety 7/27/2017  Asthma  Bertolotti's syndrome  Depression  GERD (gastroesophageal reflux disease)  Migraines   
 migraines Past Surgical History:  
Procedure Laterality Date  DERMATOLOGICAL PROCEDURE  2002  
 purple lesion removed from lower back  HX BACK SURGERY  11/06/2018  HX LUMBAR DISKECTOMY L4 L5 Social History Tobacco Use  Smoking status: Never Smoker  Smokeless tobacco: Never Used Substance Use Topics  Alcohol use: No  
 Drug use: No  
  
Family History Problem Relation Age of Onset  Arthritis-rheumatoid Father  Other Father   
     muscular neuropathy  Hypertension Maternal Grandfather  Cancer Paternal Grandfather   
     bone marrow Advance Care Planning 2/8/2019 Patient's Healthcare Decision Maker is: Verbal statement (Legal Next of Kin remains as decision maker) Confirm Advance Directive None Patient Would Like to Complete Advance Directive No  
 
Test Results:  
 
Office Visit on 02/22/2019 Component Date Value Ref Range Status  VALID INTERNAL CONTROL POC 02/22/2019 Yes   Final  
 Influenza A Ag POC 02/22/2019 Positive  Negative Pos/Neg Final  
 Influenza B Ag POC 02/22/2019 Negative  Negative Pos/Neg Final  
Office Visit on 02/08/2019 Component Date Value Ref Range Status  WBC 02/08/2019 4.5  3.4 - 10.8 x10E3/uL Final  
 RBC 02/08/2019 4.05  3.77 - 5.28 x10E6/uL Final  
 HGB 02/08/2019 12.7  11.1 - 15.9 g/dL Final  
 HCT 02/08/2019 37.4  34.0 - 46.6 % Final  
 MCV 02/08/2019 92  79 - 97 fL Final  
 MCH 02/08/2019 31.4  26.6 - 33.0 pg Final  
 MCHC 02/08/2019 34.0  31.5 - 35.7 g/dL Final  
 RDW 02/08/2019 12.9  12.3 - 15.4 % Final  
 PLATELET 57/72/9295 661  150 - 379 x10E3/uL Final  
 NEUTROPHILS 02/08/2019 57  Not Estab. % Final  
 Lymphocytes 02/08/2019 33  Not Estab. % Final  
 MONOCYTES 02/08/2019 7  Not Estab. % Final  
 EOSINOPHILS 02/08/2019 3  Not Estab. % Final  
 BASOPHILS 02/08/2019 0  Not Estab. % Final  
 ABS. NEUTROPHILS 02/08/2019 2.6  1.4 - 7.0 x10E3/uL Final  
 Abs Lymphocytes 02/08/2019 1.5  0.7 - 3.1 x10E3/uL Final  
 ABS. MONOCYTES 02/08/2019 0.3  0.1 - 0.9 x10E3/uL Final  
 ABS.  EOSINOPHILS 02/08/2019 0.1  0.0 - 0.4 x10E3/uL Final  
  ABS. BASOPHILS 02/08/2019 0.0  0.0 - 0.2 x10E3/uL Final  
 IMMATURE GRANULOCYTES 02/08/2019 0  Not Estab. % Final  
 ABS. IMM. GRANS. 02/08/2019 0.0  0.0 - 0.1 x10E3/uL Final  
 Glucose 02/08/2019 83  65 - 99 mg/dL Final  
 BUN 02/08/2019 6  6 - 20 mg/dL Final  
 Creatinine 02/08/2019 0.80  0.57 - 1.00 mg/dL Final  
 GFR est non-AA 02/08/2019 106  >59 mL/min/1.73 Final  
 GFR est AA 02/08/2019 123  >59 mL/min/1.73 Final  
 BUN/Creatinine ratio 02/08/2019 8* 9 - 23 Final  
 Sodium 02/08/2019 142  134 - 144 mmol/L Final  
 Potassium 02/08/2019 3.6  3.5 - 5.2 mmol/L Final  
 Chloride 02/08/2019 105  96 - 106 mmol/L Final  
 CO2 02/08/2019 20  20 - 29 mmol/L Final  
 Calcium 02/08/2019 9.1  8.7 - 10.2 mg/dL Final  
 Protein, total 02/08/2019 6.6  6.0 - 8.5 g/dL Final  
 Albumin 02/08/2019 4.4  3.5 - 5.5 g/dL Final  
 GLOBULIN, TOTAL 02/08/2019 2.2  1.5 - 4.5 g/dL Final  
 A-G Ratio 02/08/2019 2.0  1.2 - 2.2 Final  
 Bilirubin, total 02/08/2019 0.7  0.0 - 1.2 mg/dL Final  
 Alk. phosphatase 02/08/2019 61  39 - 117 IU/L Final  
 AST (SGOT) 02/08/2019 16  0 - 40 IU/L Final  
 ALT (SGPT) 02/08/2019 6  0 - 32 IU/L Final  
 Hemoglobin A1c 02/08/2019 5.0  4.8 - 5.6 % Final  
 Comment:          Prediabetes: 5.7 - 6.4 Diabetes: >6.4 Glycemic control for adults with diabetes: <7.0  Estimated average glucose 02/08/2019 97  mg/dL Final  
 Cholesterol, total 02/08/2019 131  100 - 199 mg/dL Final  
 Triglyceride 02/08/2019 105  0 - 149 mg/dL Final  
 HDL Cholesterol 02/08/2019 57  >39 mg/dL Final  
 VLDL, calculated 02/08/2019 21  5 - 40 mg/dL Final  
 LDL, calculated 02/08/2019 53  0 - 99 mg/dL Final  
 Calcitriol (Vit D 1, 25 di-OH) 02/08/2019 62.1  19.9 - 79.3 pg/mL Final  
 Vitamin B12 02/08/2019 527  232 - 1,245 pg/mL Final  
 Folate 02/08/2019 >20.0  >3.0 ng/mL Final  
 Comment: A serum folate concentration of less than 3.1 ng/mL is considered to represent clinical deficiency.  TSH 02/08/2019 4.180  0.450 - 4.500 uIU/mL Final  
 Amphetamines, urine 02/08/2019 Negative  Zpjexc=8759 ng/mL Final  
 Amphetamine test includes Amphetamine and Methamphetamine.  MDMA 02/08/2019 Negative  Dijkns=696 ng/mL Final  
 Barbiturates 02/08/2019 Negative  Oegzvh=421 ng/mL Final  
 Benzodiazepines 02/08/2019 Negative  Oqbnpn=626 ng/mL Final  
 Cannabinoids 02/08/2019 Negative  Cutoff=50 ng/mL Final  
 Cocaine 02/08/2019 Negative  Zifgsi=345 ng/mL Final  
 Methaqualone 02/08/2019 Negative  Ppfdga=876 ng/mL Final  
 Opiates 02/08/2019 Negative  Zqzlav=106 ng/mL Final  
 Opiate test includes Codeine and Morphine only.  Phencyclidine 02/08/2019 Negative  Cutoff=25 ng/mL Final  
 Methadone Screen, Urine 02/08/2019 Negative  Kyspkv=678 ng/mL Final  
 PROPOXYPHENE, URINE 02/08/2019 Negative  Uccbna=180 ng/mL Final  
 Specific Gravity 02/08/2019 1.007  1.005 - 1.030 Final  
 pH (UA) 02/08/2019 6.0  5.0 - 7.5 Final  
 Color 02/08/2019 Yellow  Yellow Final  
 Appearance 02/08/2019 Clear  Clear Final  
 Leukocyte Esterase 02/08/2019 Negative  Negative Final  
 Protein 02/08/2019 Negative  Negative/Trace Final  
 Glucose 02/08/2019 Negative  Negative Final  
 Ketone 02/08/2019 Negative  Negative Final  
 Blood 02/08/2019 Negative  Negative Final  
 Bilirubin 02/08/2019 Negative  Negative Final  
 Urobilinogen 02/08/2019 0.2  0.2 - 1.0 mg/dL Final  
 Nitrites 02/08/2019 Negative  Negative Final  
 Microscopic Examination 02/08/2019 Comment   Final  
 Microscopic not indicated and not performed.  Pregnancy test, urine 02/08/2019 Negative  Negative Final  
 SPECIMEN STATUS REPORT 02/08/2019 COMMENT   Final  
 
Objective:  
 
Vitals:  
 03/19/19 1106 BP: 118/72 Pulse: 78 Resp: 18 Temp: 99 °F (37.2 °C) TempSrc: Oral  
SpO2: 100% Weight: 148 lb 14.4 oz (67.5 kg) Height: 5' 4\" (1.626 m) Wt Readings from Last 3 Encounters: 03/19/19 148 lb 14.4 oz (67.5 kg) 02/22/19 148 lb 8 oz (67.4 kg) 02/08/19 151 lb (68.5 kg) BP Readings from Last 3 Encounters:  
03/19/19 118/72  
02/22/19 111/78  
02/08/19 124/74 Review of Systems Constitutional: Positive for activity change, fatigue and fever. Negative for appetite change. HENT: Positive for sinus pressure, sore throat and trouble swallowing. Negative for congestion, dental problem, ear pain, hearing loss, postnasal drip and sneezing. Eyes: Negative for discharge, redness and visual disturbance. Respiratory: Positive for cough. Negative for apnea, chest tightness, shortness of breath and wheezing. Cardiovascular: Negative for chest pain, palpitations and leg swelling. Gastrointestinal: Negative for abdominal distention, abdominal pain, blood in stool, constipation, diarrhea, nausea and vomiting. Endocrine: Negative for polydipsia, polyphagia and polyuria. Genitourinary: Negative for flank pain, frequency and urgency. Musculoskeletal: Positive for arthralgias, back pain and gait problem. Negative for joint swelling and neck pain. Skin: Negative for color change, pallor, rash and wound. Allergic/Immunologic: Negative for environmental allergies and food allergies. Neurological: Negative for dizziness, tremors, weakness, light-headedness, numbness and headaches. Hematological: Negative for adenopathy. Psychiatric/Behavioral: Positive for behavioral problems, decreased concentration and sleep disturbance. Negative for agitation and confusion. The patient is nervous/anxious. Physical Exam  
Constitutional: She is oriented to person, place, and time. Vital signs are normal. She appears to not be writhing in pain, not malnourished and not dehydrated. She appears unhealthy. She does not have a sickly appearance. No distress. Young, frail,  female. Mood is sad with crying spells.  She has multiple medical conditions that appear to be wearing on her. HENT:  
Head: Normocephalic and atraumatic. Right Ear: Hearing, tympanic membrane, external ear and ear canal normal.  
Left Ear: Hearing, tympanic membrane, external ear and ear canal normal.  
Nose: Mucosal edema, rhinorrhea and sinus tenderness present. Mouth/Throat: Uvula is midline and mucous membranes are normal. Mucous membranes are not pale, not dry and not cyanotic. No oral lesions. No uvula swelling. Posterior oropharyngeal erythema present. No posterior oropharyngeal edema. Bilateral TM's are intact. No signs of cerumen or cause for Left ear pain. No signs of trauma or injury. Eyes: Conjunctivae, EOM and lids are normal. Pupils are equal, round, and reactive to light. Lids are everted and swept, no foreign bodies found. Neck: Trachea normal. Neck supple. Normal carotid pulses, no hepatojugular reflux and no JVD present. Spinous process tenderness and muscular tenderness present. Erythema and decreased range of motion present. No thyromegaly present. Chronic ongoing. Dx with Flat Neck Syndrome. Cardiovascular: Normal rate, regular rhythm, S1 normal, S2 normal, normal heart sounds, intact distal pulses and normal pulses. Exam reveals no gallop and no friction rub. No murmur heard. No extremity edema is present during today's exam.   
Pulmonary/Chest: Effort normal and breath sounds normal.  
Mild atelectasis in both lower lung lobes. Abdominal: Soft. Normal appearance and bowel sounds are normal. She exhibits no distension. There is no hepatosplenomegaly. There is no tenderness. There is no CVA tenderness. Musculoskeletal:  
     Cervical back: She exhibits decreased range of motion, tenderness, bony tenderness, pain and spasm. Lumbar back: She exhibits decreased range of motion, tenderness, swelling, edema, pain and spasm. Back: 
 
Generalized chronic extremity weakness is present.  Increased pain in the sacral region. Seems to be inflamed by Physical Therapy. Neurological: She is alert and oriented to person, place, and time. She has normal reflexes and intact cranial nerves. She displays weakness and atrophy. A sensory deficit is present. She exhibits abnormal muscle tone. Coordination and gait abnormal. GCS score is 15. Skin: Skin is warm, dry and intact. No bruising and no rash noted. She is not diaphoretic. No cyanosis or erythema. No pallor. Nails show no clubbing. See Musculator Skeletal Exam.   
Psychiatric: Her mood appears anxious. Her affect is labile. She expresses impulsivity. She exhibits a depressed mood. She is apathetic. She exhibits disordered thought content and abnormal new learning ability. She has a flat affect. Baseline. PHQ9 is now 5. She did not start the Lexapro. Educated her on the use this medication as an adjunct for her talk therapy. She is still reluctant to start it. Disclaimer: Ms. Chelsie Martinez has been advised to call or return to our office if symptoms worsen/change/persist. We as a care team including the patient; discussed expected course/resolution/complications of diagnosis in detail today. Medication risks/benefits/costs/interactions/alternatives discussed Chelsie Martinez was given an after visit summary which includes diagnoses, current medications, & vitals. Chelsie Martinez expressed understanding with the diagnosis and plan.

## 2019-03-19 NOTE — PROGRESS NOTES
ADVISED PATIENT OF THE FOLLOWING HEALTH MAINTAINCE DUE Health Maintenance Due Topic Date Due  
 Hepatitis A Peds Age 1-18 (2 of 2 - 2-dose series) 08/07/2001  PAP AKA CERVICAL CYTOLOGY  02/05/2011 Chief Complaint Patient presents with  Fever Pt being seen for fever chills that started saturday night.  Sore Throat  
  sore throat  Cough Cough with headache. I discussed with pt the above health maintenance that is due. She states that she will get them done soon. I educated her on the importance of them. 1. Have you been to the ER, urgent care clinic since your last visit? Hospitalized since your last visit? No 
 
2. Have you seen or consulted any other health care providers outside of the [address] [place] [address] since your last visit? Include any DEXA scan, mammography  or colon screening. No 
 
3. Do you have an Advance Directive on file? no 
 
4. Do you have a DNR on file? Full Patient is accompanied by self I have received verbal consent from Deena Shouts to discuss any/all medical information while they are present in the room. Advance Care Planning 2/8/2019 Patient's Healthcare Decision Maker is: Verbal statement (Legal Next of Kin remains as decision maker) Confirm Advance Directive None Patient Would Like to Complete Advance Directive   
 
 
 
[id number] - [facility], [address]  [address] Phone: [phone] Fax: [phone] Patient reminded during visit to bring all medication bottles, OTC medications to all appointments. Results for orders placed or performed in visit on 03/19/19 AMB POC MADDISON INFLUENZA A/B TEST Result Value Ref Range VALID INTERNAL CONTROL POC Yes Influenza A Ag POC Negative Negative Pos/Neg  Influenza B Ag POC Negative Negative Pos/Neg

## 2019-03-19 NOTE — PATIENT INSTRUCTIONS
Managing Your Allergies: Care Instructions Your Care Instructions Managing your allergies is an important part of staying healthy. Your doctor will help you find out what may be causing the allergies. Common causes of allergy symptoms are house dust and dust mites, animal dander, mold, and pollen. As soon as you know what triggers your symptoms, try to reduce your exposure to your triggers. This can help prevent allergy symptoms, asthma, and other health problems. Ask your doctor about allergy medicine or immunotherapy. These treatments may help reduce or prevent allergy symptoms. Follow-up care is a key part of your treatment and safety. Be sure to make and go to all appointments, and call your doctor if you are having problems. It's also a good idea to know your test results and keep a list of the medicines you take. How can you care for yourself at home? · If you think that dust or dust mites are causing your allergies: 
? Wash sheets, pillowcases, and other bedding every week in hot water. ? Use airtight, dust-proof covers for pillows, duvets, and mattresses. Avoid plastic covers, because they tend to tear quickly and do not \"breathe. \" Wash according to the instructions. ? Remove extra blankets and pillows that you don't need. ? Use blankets that are machine-washable. ? Don't use home humidifiers. They can help mites live longer. · Use air-conditioning. Change or clean all filters every month. Keep windows closed. Use high-efficiency air filters. Don't use window or attic fans, which draw dust into the air. · If you're allergic to pet dander, keep pets outside or, at the very least, out of your bedroom. Old carpet and cloth-covered furniture can hold a lot of animal dander. You may need to replace them. · Look for signs of cockroaches. Use cockroach baits to get rid of them. Then clean your home well.  
· If you're allergic to mold, don't keep indoor plants, because molds can grow in soil. Get rid of furniture, rugs, and drapes that smell musty. Check for mold in the bathroom. · If you're allergic to pollen, stay inside when pollen counts are high. · Don't smoke or let anyone else smoke in your house. Don't use fireplaces or wood-burning stoves. Avoid paint fumes, perfumes, and other strong odors. When should you call for help? Give an epinephrine shot if: 
  · You think you are having a severe allergic reaction.  
 After giving an epinephrine shot call 911, even if you feel better. 
 Call 911 if: 
  · You have symptoms of a severe allergic reaction. These may include: 
? Sudden raised, red areas (hives) all over your body. ? Swelling of the throat, mouth, lips, or tongue. ? Trouble breathing. ? Passing out (losing consciousness). Or you may feel very lightheaded or suddenly feel weak, confused, or restless.  
  · You have been given an epinephrine shot, even if you feel better.  
 Call your doctor now or seek immediate medical care if: 
  · You have symptoms of an allergic reaction, such as: ? A rash or hives (raised, red areas on the skin). ? Itching. ? Swelling. ? Belly pain, nausea, or vomiting.  
 Watch closely for changes in your health, and be sure to contact your doctor if: 
  · Your allergies get worse.  
  · You need help controlling your allergies.  
  · You have questions about allergy testing.  
  · You do not get better as expected. Where can you learn more? Go to http://earl-molly.info/. Enter L249 in the search box to learn more about \"Managing Your Allergies: Care Instructions. \" Current as of: June 27, 2018 Content Version: 11.9 © 5531-4207 [employer]. Care instructions adapted under license by [employer] (which disclaims liability or warranty for this information).  If you have questions about a medical condition or this instruction, always ask your healthcare professional. Lashell Krishnan, [facility] disclaims any warranty or liability for your use of this information. Learning About Eating Disorders for Teens What is an eating disorder? An eating disorder is a condition that causes some people to have unhealthy thoughts and behaviors about food and body image. Teens with eating disorders often base how they feel about themselves on how much they weigh and how they look. Common eating disorders include: · Anorexia. Teens with this problem limit how much food they eat. They can become dangerously thin. · Bulimia. Teens with this problem eat too much in a short time. Then they do something to get rid of the food, like making themselves vomit, so they won't gain weight. · Binge eating disorder, or compulsive overeating. Teens with this problem eat too much, too fast. They do this on a regular basis for several months. It can be hard to know what is an \"ideal\" body shape or body size when TV and magazines show unrealistic images of what it means to be thin. The way a skinny model looks in a magazine or TV ad is not normal or \"ideal.\" Healthy bodies come in lots of shapes and sizes. Most of us will never look like fashion models or world-class athletes. But when you treat your body wellfeed it healthy food and move it in ways it's built to [facility] [place] can feel good about it. What are the symptoms? Teens who have an eating disorder often strongly deny that they have a problem. They do not see or believe that they do. But there are some feelings and actions that are common with each type of eating disorder. Teens who have anorexia: 
· Weigh much less than is healthy or normal. 
· Are very afraid of gaining weight. · Think they are overweight even when they are very thin. · Obsess about food, weight, and dieting. · Strictly limit how much they eat. · Vomit or use laxatives or water pills (diuretics) so they won't gain weight. · Become secretive. They may pull away from family and friends, make excuses not to eat around other people, and lie about their eating habits. Teens who have bulimia: 
· Eat way too much in a short time (called binging), often over a couple of hours or less, on a regular basis. · Feel out of control and feel like they can't stop eating during a binge. · Go to the bathroom right after meals. · Overeat but don't gain weight. · Are secretive about eating, hide food, or won't eat around other people. · Purge to get rid of the food so they won't gain weight. They may make themselves vomit, exercise very hard or for a long time, or misuse laxatives, enemas, water pills, or other medicines. Teens with binge eating disorder: 
· Eat way too much in a short time, often over a couple of hours or less, on a regular basis. · Eat for emotional reasons, such as being sad, angry, lonely, or bored. · Feel like they can't stop eating and eat so much that they feel painfully full. · Overeat and may gain weight. · Feel unhappy, upset, guilty, or depressed after they binge. · Eat alone because they are embarrassed about how much they eat. How are eating disorders treated? Treatment for eating disorders includes counseling and sometimes medicines. Some teens use both. · Cognitive-behavioral therapy can help teens who have an eating disorder change the way they think about food and the way they view their body. And it can help teens deal with feelings or situations that may have brought on their eating disorder. Sometimes family members take part in a teen's therapy so that they can learn ways to support their loved one's recovery. · Nutritional counseling can help teens get back to and stay at a healthy weight and learn healthy eating habits. · Medicines called antidepressants can help reduce episodes of binging and purging or treat other problems teens may be dealing with, such as anxiety or depression. No one should feel embarrassed or ashamed about having an eating disorder. It's not caused by personal weakness, and it isn't a character flaw. Many teens struggle with eating disorders for a long time. If you think you have an eating disorder, get help. If left untreated, eating disorders can cause serious health problems. Treatment can help you feel better and be healthier. If you think a friend or a family member has an eating disorder, tell someone who can make a difference, like a parent, teacher, counselor, or doctor. Follow-up care is a key part of your treatment and safety. Be sure to make and go to all appointments, and call your doctor if you are having problems. It's also a good idea to know your test results and keep a list of the medicines you take. Where can you learn more? Go to http://earl-molly.info/. Enter N201 in the search box to learn more about \"Learning About Eating Disorders for Teens. \" Current as of: September 11, 2018 Content Version: 11.9 © 5290-7478 [employer]. Care instructions adapted under license by [employer] (which disclaims liability or warranty for this information). If you have questions about a medical condition or this instruction, always ask your healthcare professional. [address] any warranty or liability for your use of this information. Learning About Mood Disorders What are mood disorders? Mood disorders are medical problems that affect how you feel. They can impact your moods, thoughts, and actions. Mood disorders include: · Depression. This causes you to feel sad or hopeless for much of the time. · Bipolar disorder. This causes extreme mood changes from manic episodes of very high energy to extreme lows of depression. · Seasonal affective disorder (SAD). This is a type of depression that affects you during the same season each year.  Most often people experience SAD during the fall and winter months when days are shorter and there is less light. What are the symptoms? Depression You may: · Feel sad or hopeless nearly every day. · Lose interest in or not get pleasure from most daily activities. You feel this way nearly every day. · Have low energy, changes in your appetite, or changes in how well you sleep. · Have trouble concentrating. · Think about death and suicide. Keep the numbers for these national suicide hotlines: 0-644-408-TALK (7-164.158.8655) and 4-918-JUKWLAO (9-347.575.4722). If you or someone you know talks about suicide or feeling hopeless, get help right away. Bipolar disorder Symptoms depend on your mood swings. You may: · Feel very happy, energetic, or on edge. · Feel like you need very little sleep. · Feel overly self-confident. · Do impulsive things, such as spending a lot of money. · Feel sad or hopeless. · Have racing thoughts or trouble thinking and making decisions. · Lose interest in things you have enjoyed in the past. 
· Think about death and suicide. Keep the numbers for these national suicide hotlines: 5-646-633-TALK (6-134.716.8181) and 8-051-SSAZPBQ (2-096-310-565.910.4767). If you or someone you know talks about suicide or feeling hopeless, get help right away. Seasonal affective disorder (SAD) Symptoms come and go at about the same time each year. For most people with SAD, symptoms come during the winter when there is less daylight. You may: · Feel sad, grumpy, mariano, or anxious. · Lose interest in your usual activities. · Eat more and crave carbohydrates, such as bread and pasta. · Gain weight. · Sleep more and feel drowsy during the daytime. How are mood disorders treated? Mood disorders can be treated with medicines or counseling, or a combination of both. Medicines for depression and SAD may include antidepressants. Medicines for bipolar disorder may include: · Mood stabilizers. · Antipsychotics. · Benzodiazepines. Counseling may involve cognitive-behavioral therapy. It teaches you how to change the ways you think and behave. This can help you stop thinking bad thoughts about yourself and your life. Light therapy is the main treatment for SAD. This therapy uses a special kind of lamp. You let the lamp shine on you at certain times, usually in the morning. This may help your symptoms during the months when there is less sunlight. Healthy lifestyle Healthy lifestyle changes may help you feel better. · Get at least 30 minutes of exercise on most days of the week. Walking is a good choice. · Eat a healthy diet. Include fruits, vegetables, lean proteins, and whole grains in your diet each day. · Keep a regular sleep schedule. Try for 8 hours of sleep a night. · Find ways to manage stress, such as relaxation exercises. · Avoid alcohol and illegal drugs. Follow-up care is a key part of your treatment and safety. Be sure to make and go to all appointments, and call your doctor if you are having problems. It's also a good idea to know your test results and keep a list of the medicines you take. Where can you learn more? Go to http://earl-molly.info/. Enter Y556 in the search box to learn more about \"Learning About Mood Disorders. \" Current as of: September 11, 2018 Content Version: 11.9 © 2471-5057 [employer]. Care instructions adapted under license by [employer] (which disclaims liability or warranty for this information). If you have questions about a medical condition or this instruction, always ask your healthcare professional. [place] [address] any warranty or liability for your use of this information.

## 2020-05-29 ENCOUNTER — HOSPITAL ENCOUNTER (EMERGENCY)
Age: 20
Discharge: STILL ADMITTED OR ADMINISTRATIVE | End: 2020-05-29
Attending: EMERGENCY MEDICINE

## 2020-05-29 PROCEDURE — 75810000275 HC EMERGENCY DEPT VISIT NO LEVEL OF CARE

## 2022-03-18 PROBLEM — Z98.890 STATUS POST LUMBAR DISCECTOMY: Status: ACTIVE | Noted: 2018-12-04

## 2022-03-18 PROBLEM — G89.29 CHRONIC MIDLINE LOW BACK PAIN WITH SCIATICA: Status: ACTIVE | Noted: 2018-06-11

## 2022-03-18 PROBLEM — L90.5 ACNE SCARRING: Status: ACTIVE | Noted: 2018-12-04

## 2022-03-18 PROBLEM — M54.40 CHRONIC MIDLINE LOW BACK PAIN WITH SCIATICA: Status: ACTIVE | Noted: 2018-06-11

## 2022-03-18 PROBLEM — F41.9 ANXIETY: Status: ACTIVE | Noted: 2017-07-27

## 2022-03-18 PROBLEM — L73.0 ACNE SCARRING: Status: ACTIVE | Noted: 2018-12-04

## 2022-03-19 PROBLEM — L20.84 INTRINSIC ECZEMA: Status: ACTIVE | Noted: 2018-12-04

## 2022-03-19 PROBLEM — L71.9 ACNE ERYTHEMATOSA: Status: ACTIVE | Noted: 2018-12-04

## 2023-05-22 RX ORDER — CETIRIZINE HYDROCHLORIDE 10 MG/1
10 TABLET ORAL DAILY
COMMUNITY
Start: 2019-03-19

## 2023-05-22 RX ORDER — IBUPROFEN 400 MG/1
TABLET ORAL EVERY 6 HOURS PRN
COMMUNITY

## 2023-12-07 ENCOUNTER — ANESTHESIA (OUTPATIENT)
Facility: HOSPITAL | Age: 23
End: 2023-12-07
Payer: COMMERCIAL

## 2023-12-07 ENCOUNTER — HOSPITAL ENCOUNTER (OUTPATIENT)
Facility: HOSPITAL | Age: 23
Setting detail: OUTPATIENT SURGERY
Discharge: HOME | End: 2023-12-07
Attending: INTERNAL MEDICINE | Admitting: INTERNAL MEDICINE
Payer: COMMERCIAL

## 2023-12-07 ENCOUNTER — ANESTHESIA EVENT (OUTPATIENT)
Facility: HOSPITAL | Age: 23
End: 2023-12-07
Payer: COMMERCIAL

## 2023-12-07 VITALS
HEIGHT: 65 IN | TEMPERATURE: 97.8 F | WEIGHT: 138.8 LBS | SYSTOLIC BLOOD PRESSURE: 104 MMHG | DIASTOLIC BLOOD PRESSURE: 59 MMHG | BODY MASS INDEX: 23.13 KG/M2 | OXYGEN SATURATION: 100 % | HEART RATE: 64 BPM | RESPIRATION RATE: 17 BRPM

## 2023-12-07 LAB — HCG UR QL: NEGATIVE

## 2023-12-07 PROCEDURE — 2580000003 HC RX 258: Performed by: INTERNAL MEDICINE

## 2023-12-07 PROCEDURE — 2500000003 HC RX 250 WO HCPCS: Performed by: ANESTHESIOLOGY

## 2023-12-07 PROCEDURE — 3700000000 HC ANESTHESIA ATTENDED CARE: Performed by: INTERNAL MEDICINE

## 2023-12-07 PROCEDURE — 7100000011 HC PHASE II RECOVERY - ADDTL 15 MIN: Performed by: INTERNAL MEDICINE

## 2023-12-07 PROCEDURE — 81025 URINE PREGNANCY TEST: CPT

## 2023-12-07 PROCEDURE — 88305 TISSUE EXAM BY PATHOLOGIST: CPT

## 2023-12-07 PROCEDURE — 2500000003 HC RX 250 WO HCPCS: Performed by: NURSE ANESTHETIST, CERTIFIED REGISTERED

## 2023-12-07 PROCEDURE — 7100000010 HC PHASE II RECOVERY - FIRST 15 MIN: Performed by: INTERNAL MEDICINE

## 2023-12-07 PROCEDURE — 3600007502: Performed by: INTERNAL MEDICINE

## 2023-12-07 PROCEDURE — 3600007512: Performed by: INTERNAL MEDICINE

## 2023-12-07 PROCEDURE — 3700000001 HC ADD 15 MINUTES (ANESTHESIA): Performed by: INTERNAL MEDICINE

## 2023-12-07 PROCEDURE — 6360000002 HC RX W HCPCS: Performed by: NURSE ANESTHETIST, CERTIFIED REGISTERED

## 2023-12-07 RX ORDER — SODIUM CHLORIDE 0.9 % (FLUSH) 0.9 %
5-40 SYRINGE (ML) INJECTION PRN
Status: DISCONTINUED | OUTPATIENT
Start: 2023-12-07 | End: 2023-12-07 | Stop reason: HOSPADM

## 2023-12-07 RX ORDER — SODIUM CHLORIDE 9 MG/ML
25 INJECTION, SOLUTION INTRAVENOUS PRN
Status: DISCONTINUED | OUTPATIENT
Start: 2023-12-07 | End: 2023-12-07 | Stop reason: HOSPADM

## 2023-12-07 RX ORDER — EPHEDRINE SULFATE/0.9% NACL/PF 50 MG/5 ML
SYRINGE (ML) INTRAVENOUS PRN
Status: DISCONTINUED | OUTPATIENT
Start: 2023-12-07 | End: 2023-12-07 | Stop reason: SDUPTHER

## 2023-12-07 RX ORDER — SODIUM CHLORIDE 0.9 % (FLUSH) 0.9 %
5-40 SYRINGE (ML) INJECTION EVERY 12 HOURS SCHEDULED
Status: DISCONTINUED | OUTPATIENT
Start: 2023-12-07 | End: 2023-12-07 | Stop reason: HOSPADM

## 2023-12-07 RX ORDER — DICYCLOMINE HYDROCHLORIDE 10 MG/1
CAPSULE ORAL
COMMUNITY

## 2023-12-07 RX ADMIN — PROPOFOL 50 MG: 10 INJECTION, EMULSION INTRAVENOUS at 09:14

## 2023-12-07 RX ADMIN — PROPOFOL 150 MG: 10 INJECTION, EMULSION INTRAVENOUS at 09:11

## 2023-12-07 RX ADMIN — PROPOFOL 100 MG: 10 INJECTION, EMULSION INTRAVENOUS at 09:22

## 2023-12-07 RX ADMIN — PROPOFOL 100 MG: 10 INJECTION, EMULSION INTRAVENOUS at 09:33

## 2023-12-07 RX ADMIN — PROPOFOL 100 MG: 10 INJECTION, EMULSION INTRAVENOUS at 09:19

## 2023-12-07 RX ADMIN — PROPOFOL 100 MG: 10 INJECTION, EMULSION INTRAVENOUS at 09:29

## 2023-12-07 RX ADMIN — Medication 5 MG: at 08:03

## 2023-12-07 RX ADMIN — PROPOFOL 100 MG: 10 INJECTION, EMULSION INTRAVENOUS at 09:25

## 2023-12-07 RX ADMIN — LIDOCAINE HYDROCHLORIDE 100 MG: 20 INJECTION, SOLUTION EPIDURAL; INFILTRATION; INTRACAUDAL; PERINEURAL at 09:11

## 2023-12-07 RX ADMIN — SODIUM CHLORIDE 25 ML: 9 INJECTION, SOLUTION INTRAVENOUS at 07:58

## 2023-12-07 ASSESSMENT — PAIN - FUNCTIONAL ASSESSMENT: PAIN_FUNCTIONAL_ASSESSMENT: NONE - DENIES PAIN

## 2023-12-07 NOTE — PROGRESS NOTES
[id number] Patient had a significant decrease in heart rate and blood pressure during the first attempt of  IV insertion. She had a decrease in responsiveness yet able to respond to verbally. Dr Issac Perez called to bedside. IV placed in the right AC. Instructed to give 500ml of fluid. 5mg of ephedrine given by MD.     5095 Blood pressure and heart rate increased. See doc flow sheets. She is alert and responsive. No complaints at this time. 0095 Endoscopy Case End Note:    Procedure scope was pre-cleaned, per protocol, at bedside by Curtis Villanueva. Report received from anesthesia. See anesthesia flowsheet for intra-procedure vital signs and events. Belongings returned to patient.

## 2023-12-07 NOTE — ANESTHESIA PRE PROCEDURE
Department of Anesthesiology  Preprocedure Note       Name:  Namrata Zuniga   Age:  24 y. o.  :  2000                                          MRN:  [id number]         Date:  2023      Surgeon: Raeann Salgado):  Timmy Jorge MD    Procedure: Procedure(s):  COLONOSCOPY WITH BIOPSY TO RULE OUT MICROSCOPIC COLITIS AND INFLAMMATORY BOWEL DISEASE    Medications prior to admission:   Prior to Admission medications    Medication Sig Start Date End Date Taking? Authorizing Provider   rifAXIMin (XIFAXAN) 550 MG tablet Take 1 tablet by mouth as needed (takes once or twice daily based on discomfort)   Yes Provider, MD Alonzo   dicyclomine (BENTYL) 10 MG capsule 28  Patient not taking: Reported on 2023    ProviderAlonzo MD   ibuprofen (ADVIL;MOTRIN) 400 MG tablet Take by mouth every 6 hours as needed    Automatic Reconciliation, Ar       Current medications:    No current facility-administered medications for this encounter.        Allergies:  No Known Allergies    Problem List:    Patient Active Problem List   Diagnosis Code    Status post lumbar discectomy Z98.890    Migraines G43.909    Acne scarring L73.0    Anxiety F41.9    Chronic midline low back pain with sciatica M54.40, G89.29    Acne erythematosa L71.9    Intrinsic eczema L20.84       Past Medical History:        Diagnosis Date    Anxiety 2017    Asthma     Bertolotti's syndrome     Depression     Fusion of spine, site unspecified 10/10/2019    GERD (gastroesophageal reflux disease)     Hypermobility syndrome 10/10/2019    Migraines     migraines    Other congenital malformations of spine, 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
Dental:          Pulmonary:Negative Pulmonary ROS   (+)           asthma:                            Cardiovascular:Negative CV ROS  Exercise tolerance: good (>4 METS)                    Neuro/Psych:   Negative Neuro/Psych ROS  (+) headaches:depression/anxiety    (-) neuromuscular disease and psychiatric history           GI/Hepatic/Renal: Neg GI/Hepatic/Renal ROS  (+) GERD:          Endo/Other: Negative Endo/Other ROS   (+) : arthritis:. .                 Abdominal:             Vascular: negative vascular ROS.          Other Findings:         Anesthesia Plan        Toña Strange MD   12/7/2023

## 2023-12-07 NOTE — OP NOTE
Colonoscopy Procedure Note      Indications:  chronic diarrhea     : Chirag Martinez MD    Staff: Perioperative Nurse: Kortney Conroy RN  Endoscopy Technician: Rhea Alvarenga    Referring Provider: KARL Jensen NP    Sedation:  MAC anesthesia Propofol    Procedure Details:  After informed consent was obtained with all risks and benefits of procedure explained and preoperative exam completed, the patient was taken to the endoscopy suite and placed in the left lateral decubitus position. Upon sequential sedation as per above, a digital rectal exam was performed per below. The Olympus videocolonoscope was inserted in the rectum and carefully advanced to the cecum, which was identified by the ileocecal valve and appendiceal orifice, terminal ileum. The quality of preparation was excellent. Bradford The colonoscope was slowly withdrawn with careful evaluation between folds. Retroflexion in the rectum was performed. Findings:   DONTRELL: hemorrhoids  Rectum: small internal hemorrhoids on retroflexion, otherwise unremarkable  Sigmoid: normal mucosa, biopsied cold forceps with minimal bleeding to r/o microscopic colitis in setting of chronic diarrhea. Descending Colon: normal mucosa, biopsied cold forceps with minimal bleeding to r/o microscopic colitis in setting of chronic diarrhea. Transverse Colon: normal mucosa, biopsied cold forceps with minimal bleeding to r/o microscopic colitis in setting of chronic diarrhea. Ascending Colon: normal mucosa, biopsied cold forceps with minimal bleeding to r/o microscopic colitis in setting of chronic diarrhea. Cecum: normal  no mucosal lesion appreciated  Terminal Ileum: normal mucosa, biopsied cold forceps with minimal bleeding to r/o microscopic ileitis in setting of chronic diarrhea. Complications: None. EBL:  minimal    Impression:    See Findings above    Recommendations:   - Await pathology.  You should receive a letter

## 2023-12-07 NOTE — PROGRESS NOTES
ARRIVAL INFORMATION:  Verified patient name and date of birth, scheduled procedure, and informed consent. Belongings with patient include:  Clothing, cellphone    GI FOCUSED ASSESSMENT:  Neuro: Awake, alert, oriented x4  Respiratory: even and unlabored   GI: soft and non-distended  EKG Rhythm: normal sinus rhythm    Education:Reviewed general discharge instructions and  information.

## 2023-12-07 NOTE — ANESTHESIA POSTPROCEDURE EVALUATION
Department of Anesthesiology  Postprocedure Note    Patient:  Sherri Reeys  MRN: [id number]  YOB: 2000  Date of evaluation: 12/7/2023      Procedure Summary       Date: 12/07/23 Room / Location: [facility] ENDO 01 / [facility] ENDOSCOPY    Anesthesia Start: 0909 Anesthesia Stop: 0939    Procedure: COLONOSCOPY WITH BIOPSY Diagnosis:       Irritable bowel syndrome with diarrhea      (Irritable bowel syndrome with diarrhea [K58.0])    Surgeons: Kezia Sheridan MD Responsible Provider: Kelly Raman MD    Anesthesia Type: TIVA, MAC ASA Status: 2            Anesthesia Type: TIVA, MAC    Alysa Phase I: Alysa Score: 10    Alysa Phase II: Alysa Score: 10      Anesthesia Post Evaluation    Patient location during evaluation: bedside  Patient participation: complete - patient participated  Level of consciousness: awake  Pain score: 0  Airway patency: patent  Nausea & Vomiting: no nausea and no vomiting  Complications: no  Cardiovascular status: blood pressure returned to baseline  Respiratory status: acceptable  Hydration status: euvolemic  Pain management: adequate

## 2023-12-07 NOTE — DISCHARGE INSTRUCTIONS
Tacho Malin  [id number]  2000    It was my pleasure seeing you for your procedure. You will also receive a summary report with the findings from this procedure and any further recommendations. If you had polyps removed or biopsies taken during your procedure, you will receive a separate letter from me within the next 2 weeks. If you don't receive this letter or if you have any questions, please call my office [phone]. Please take note of the post procedure instructions listed below. Best Wishes,    Dr. Lena Buckner    These instructions give you information on caring for yourself after your procedure. Call your doctor if you have any problems or questions after your procedure. HOME CARE  Walk if you have belly cramping or gas. Walking will help get rid of the air and reduce the bloated feeling in your belly (abdomen). Your IV site (where you received drugs) may be tender to touch. Place warm towels on the site; keep your arm up on two pillows if you have any swelling or soreness in the area. You may shower. ACTIVITY:  Take frequent rest periods and move at a slower pace for the next 24 hours. .  You may resume your regular activity tomorrow if you are feeling back to normal.  Do not drive or ride a bicycle for at least 24 hours (because of the medicine (anesthesia) used during the test). Do not sign any important legal documents or use or operate any machinery for 24 hours  Do not take sleeping medicines/nerve drugs for 24 hours unless the doctor tells you. You can return to work/school tomorrow unless otherwise instructed. NUTRITION:  Drink plenty of fluids to keep your pee (urine) clear or pale yellow  Begin with a light meal and progress to your normal diet. Heavy or fried foods are harder to digest and may make you feel sick to your stomach (nauseated).   Once you are feeling back to normal, you may resume your normal diet

## 2023-12-21 ENCOUNTER — OFFICE VISIT (OUTPATIENT)
Age: 23
End: 2023-12-21
Payer: COMMERCIAL

## 2023-12-21 VITALS
OXYGEN SATURATION: 99 % | WEIGHT: 134.6 LBS | BODY MASS INDEX: 22.42 KG/M2 | DIASTOLIC BLOOD PRESSURE: 58 MMHG | SYSTOLIC BLOOD PRESSURE: 105 MMHG | HEART RATE: 75 BPM | TEMPERATURE: 98 F | HEIGHT: 65 IN | RESPIRATION RATE: 17 BRPM

## 2023-12-21 DIAGNOSIS — K25.9 MULTIPLE GASTRIC ULCERS: ICD-10-CM

## 2023-12-21 DIAGNOSIS — R19.7 DIARRHEA, UNSPECIFIED TYPE: Primary | ICD-10-CM

## 2023-12-21 DIAGNOSIS — K21.9 GASTROESOPHAGEAL REFLUX DISEASE WITHOUT ESOPHAGITIS: ICD-10-CM

## 2023-12-21 PROCEDURE — 99203 OFFICE O/P NEW LOW 30 MIN: CPT | Performed by: FAMILY MEDICINE

## 2023-12-26 NOTE — PROGRESS NOTES
HISTORY AND PHYSICAL               Patient Name: Rod Barger     YOB: 2000      Age:  24 y.o. Chief Complaint     Chief Complaint   Patient presents with    [facility]     Patient is here to [facility] care no prior pcp    Cough     Patient has had a cough since thanksgiving and would like to discuss     GI Problem     Patient has had IBS and had recent colonoscopy and and had inflamation and would like to discuss possible labs she needs completed         History Obtained From   patient    History of Present Illness   C/o cough that has been on going since thanksgiving. States that it is present through out the day, has a history of sour taste in her mouth. Patient has a hx of reflux and IBS mixed type, being worked up. She was on rifaximin? Assuming for c diff? Has had colonoscopy, endoscopy. 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    Bertolotti Syndrome    Sacrococcygeal disorders, not elsewhere classified 10/10/2019    Sacroiliitis, not elsewhere classified ([address]) 10/10/2019        Past Surgical History     Past Surgical History:   Procedure Laterality Date    BACK SURGERY  11/06/2018    COLONOSCOPY N/A 12/7/2023    COLONOSCOPY WITH BIOPSY performed by Ute Morris MD at \[facility]\"" ENDOSCOPY    CYST REMOVAL  2019    tailbone cyst    DERMATOLOGICAL PROCEDURE  2002    purple lesion removed from lower back    LUMBAR DISCECTOMY      L4 L5    WISDOM TOOTH EXTRACTION          Medications Prior to Admission     Prior to Admission medications    Medication Sig Start Date End Date Taking?  Authorizing Provider

## 2024-02-20 ENCOUNTER — TELEPHONE (OUTPATIENT)
Age: 24
End: 2024-02-20

## 2024-02-20 NOTE — TELEPHONE ENCOUNTER
Patient was requesting a referral for PT for neck and shoulders. She has previously gone before. Hypermobility- pain in neck  [facility]       [phone]

## 2024-06-26 ENCOUNTER — TELEPHONE (OUTPATIENT)
Age: 24
End: 2024-06-26

## 2024-11-05 PROBLEM — K58.0 IRRITABLE BOWEL SYNDROME WITH DIARRHEA: Status: ACTIVE | Noted: 2023-10-31

## 2024-11-05 PROBLEM — K27.3: Status: ACTIVE | Noted: 2020-05-29

## 2024-11-05 PROBLEM — R51.9 HEADACHE: Status: ACTIVE | Noted: 2017-06-04

## 2024-11-13 ENCOUNTER — TELEMEDICINE (OUTPATIENT)
Age: 24
End: 2024-11-13
Payer: COMMERCIAL

## 2024-11-13 DIAGNOSIS — K04.7 DENTAL INFECTION: Primary | ICD-10-CM

## 2024-11-13 DIAGNOSIS — K58.2 IRRITABLE BOWEL SYNDROME WITH BOTH CONSTIPATION AND DIARRHEA: ICD-10-CM

## 2024-11-13 DIAGNOSIS — Z11.59 ENCOUNTER FOR HCV SCREENING TEST FOR LOW RISK PATIENT: ICD-10-CM

## 2024-11-13 DIAGNOSIS — R53.83 FATIGUE, UNSPECIFIED TYPE: ICD-10-CM

## 2024-11-13 DIAGNOSIS — Z11.4 SCREENING FOR HIV (HUMAN IMMUNODEFICIENCY VIRUS): ICD-10-CM

## 2024-11-13 PROCEDURE — 99214 OFFICE O/P EST MOD 30 MIN: CPT | Performed by: FAMILY MEDICINE

## 2024-11-13 RX ORDER — ESCITALOPRAM OXALATE 10 MG/1
10 TABLET ORAL DAILY
COMMUNITY

## 2024-11-13 ASSESSMENT — PATIENT HEALTH QUESTIONNAIRE - PHQ9
1. LITTLE INTEREST OR PLEASURE IN DOING THINGS: NOT AT ALL
8. MOVING OR SPEAKING SO SLOWLY THAT OTHER PEOPLE COULD HAVE NOTICED. OR THE OPPOSITE, BEING SO FIGETY OR RESTLESS THAT YOU HAVE BEEN MOVING AROUND A LOT MORE THAN USUAL: NOT AT ALL
SUM OF ALL RESPONSES TO PHQ QUESTIONS 1-9: 5
3. TROUBLE FALLING OR STAYING ASLEEP: SEVERAL DAYS
6. FEELING BAD ABOUT YOURSELF - OR THAT YOU ARE A FAILURE OR HAVE LET YOURSELF OR YOUR FAMILY DOWN: NOT AT ALL
4. FEELING TIRED OR HAVING LITTLE ENERGY: NEARLY EVERY DAY
5. POOR APPETITE OR OVEREATING: NOT AT ALL
9. THOUGHTS THAT YOU WOULD BE BETTER OFF DEAD, OR OF HURTING YOURSELF: NOT AT ALL
7. TROUBLE CONCENTRATING ON THINGS, SUCH AS READING THE NEWSPAPER OR WATCHING TELEVISION: NOT AT ALL
10. IF YOU CHECKED OFF ANY PROBLEMS, HOW DIFFICULT HAVE THESE PROBLEMS MADE IT FOR YOU TO DO YOUR WORK, TAKE CARE OF THINGS AT HOME, OR GET ALONG WITH OTHER PEOPLE: SOMEWHAT DIFFICULT
SUM OF ALL RESPONSES TO PHQ QUESTIONS 1-9: 5
SUM OF ALL RESPONSES TO PHQ QUESTIONS 1-9: 5
SUM OF ALL RESPONSES TO PHQ9 QUESTIONS 1 & 2: 1
SUM OF ALL RESPONSES TO PHQ QUESTIONS 1-9: 5
2. FEELING DOWN, DEPRESSED OR HOPELESS: SEVERAL DAYS

## 2024-11-13 NOTE — PROGRESS NOTES
[facility]  [address] [place] [address]  Phone: [phone]  Fax: [phone]      Aubree Melissa, was evaluated through a synchronous (real-time) audio-video encounter. The patient (or guardian if applicable) is aware that this is a billable service, which includes applicable co-pays. This Virtual Visit was conducted with patient's (and/or legal guardian's) consent. Patient identification was verified, and a caregiver was present when appropriate.   The patient was located at Home: [address] [place] [address] [place] [address]  Provider was located at Home (Appt Dept State): [place]  Confirm you are appropriately licensed, registered, or certified to deliver care in the state where the patient is located as indicated above. If you are not or unsure, please re-schedule the visit: Yes, I confirm.       Chief Complaint   Patient presents with    Mouth Lesions     Patient had mouth sores and a fever about 2 weeks ago. Dentist would like lab work to rule out any type of infection.      She is a 24 y.o. female who presents for acute virtual visit. Usual patient of Dr. Mason.    Says that 2 weeks ago she had a tooth infection.  Had a root canal.  Had a severe infection.  Had bad fevers, mouth sores.  She was treated with abx.  Feels like things are improved now. She no longer has any oral pain.  No sores, fevers.  Has increased fatigue/weakness.  Feels like her body is fighting to have energy.  Admits that this can be related to the recent time change.  Also has a history of anemia.  Has no history of recurrent oral infections.  She was following with a dentist every 6 months for cleanings before this issue arose.    Says that the tooth that was infected had a root canal prior.    Only current medications are lexapro and birth control.  Takes lexapro for anxiety and depression. Reports that her symptoms are improved significantly, but has had some personal things come up in the past few weeks

## 2024-11-13 NOTE — PROGRESS NOTES
Identified pt with two pt identifiers(name and )    Chief Complaint   Patient presents with    Mouth Lesions     Patient had mouth sores and a fever about 2 weeks ago. Dentist would like lab work to rule out any type of infection.         Health Maintenance Due   Topic    Hepatitis A vaccine (2 of 2 - 2-dose series)    HIV screen     Hepatitis C screen     Pap smear     DTaP/Tdap/Td vaccine (7 - Td or Tdap)    Flu vaccine (1)    COVID-19 Vaccine ( season)       Wt Readings from Last 3 Encounters:   23 61.1 kg (134 lb 9.6 oz)   23 63 kg (138 lb 12.8 oz)   17 67 kg (147 lb 11.3 oz) (83%, Z= 0.94)*     * Growth percentiles are based on CDC (Girls, 2-20 Years) data.     Temp Readings from Last 3 Encounters:   23 98 °F (36.7 °C) (Temporal)   23 97.8 °F (36.6 °C) (Temporal)     BP Readings from Last 3 Encounters:   23 (!) 105/58   23 (!) 104/59   17 104/71     Pulse Readings from Last 3 Encounters:   23 75   23 64   17 82           Depression Screening:  :         2024     9:48 AM 2023    11:27 AM   PHQ-9 Questionaire   Little interest or pleasure in doing things 0 1   Feeling down, depressed, or hopeless 1 1   Trouble falling or staying asleep, or sleeping too much 1    Feeling tired or having little energy 3    Poor appetite or overeating 0    Feeling bad about yourself - or that you are a failure or have let yourself or your family down 0    Trouble concentrating on things, such as reading the newspaper or watching television 0    Moving or speaking so slowly that other people could have noticed. Or the opposite - being so fidgety or restless that you have been moving around a lot more than usual 0    Thoughts that you would be better off dead, or of hurting yourself in some way 0    PHQ-9 Total Score 5 2   If you checked off any problems, how difficult have these problems made it for you to do your work, take care of things at